# Patient Record
Sex: FEMALE | Race: ASIAN | NOT HISPANIC OR LATINO | URBAN - METROPOLITAN AREA
[De-identification: names, ages, dates, MRNs, and addresses within clinical notes are randomized per-mention and may not be internally consistent; named-entity substitution may affect disease eponyms.]

---

## 2023-07-06 ENCOUNTER — INPATIENT (INPATIENT)
Facility: HOSPITAL | Age: 68
LOS: 8 days | Discharge: HOME | DRG: 300 | End: 2023-07-15
Attending: STUDENT IN AN ORGANIZED HEALTH CARE EDUCATION/TRAINING PROGRAM | Admitting: STUDENT IN AN ORGANIZED HEALTH CARE EDUCATION/TRAINING PROGRAM
Payer: MEDICAID

## 2023-07-06 VITALS
SYSTOLIC BLOOD PRESSURE: 150 MMHG | HEIGHT: 58 IN | WEIGHT: 136.69 LBS | TEMPERATURE: 98 F | OXYGEN SATURATION: 98 % | RESPIRATION RATE: 18 BRPM | DIASTOLIC BLOOD PRESSURE: 83 MMHG | HEART RATE: 96 BPM

## 2023-07-06 PROCEDURE — 99285 EMERGENCY DEPT VISIT HI MDM: CPT

## 2023-07-06 PROCEDURE — 99053 MED SERV 10PM-8AM 24 HR FAC: CPT

## 2023-07-07 ENCOUNTER — TRANSCRIPTION ENCOUNTER (OUTPATIENT)
Age: 68
End: 2023-07-07

## 2023-07-07 DIAGNOSIS — I82.409 ACUTE EMBOLISM AND THROMBOSIS OF UNSPECIFIED DEEP VEINS OF UNSPECIFIED LOWER EXTREMITY: ICD-10-CM

## 2023-07-07 DIAGNOSIS — R09.89 OTHER SPECIFIED SYMPTOMS AND SIGNS INVOLVING THE CIRCULATORY AND RESPIRATORY SYSTEMS: ICD-10-CM

## 2023-07-07 DIAGNOSIS — C56.9 MALIGNANT NEOPLASM OF UNSPECIFIED OVARY: ICD-10-CM

## 2023-07-07 DIAGNOSIS — I82.402 ACUTE EMBOLISM AND THROMBOSIS OF UNSPECIFIED DEEP VEINS OF LEFT LOWER EXTREMITY: ICD-10-CM

## 2023-07-07 DIAGNOSIS — I80.10 PHLEBITIS AND THROMBOPHLEBITIS OF UNSPECIFIED FEMORAL VEIN: ICD-10-CM

## 2023-07-07 DIAGNOSIS — Z29.9 ENCOUNTER FOR PROPHYLACTIC MEASURES, UNSPECIFIED: ICD-10-CM

## 2023-07-07 LAB
ALBUMIN SERPL ELPH-MCNC: 4.1 G/DL — SIGNIFICANT CHANGE UP (ref 3.3–5)
ALP SERPL-CCNC: 81 U/L — SIGNIFICANT CHANGE UP (ref 40–120)
ALT FLD-CCNC: 14 U/L — SIGNIFICANT CHANGE UP (ref 10–45)
ANION GAP SERPL CALC-SCNC: 13 MMOL/L — SIGNIFICANT CHANGE UP (ref 5–17)
APTT BLD: 29.5 SEC — SIGNIFICANT CHANGE UP (ref 27.5–35.5)
APTT BLD: 31.1 SEC — SIGNIFICANT CHANGE UP (ref 27.5–35.5)
AST SERPL-CCNC: 17 U/L — SIGNIFICANT CHANGE UP (ref 10–40)
BASOPHILS # BLD AUTO: 0.02 K/UL — SIGNIFICANT CHANGE UP (ref 0–0.2)
BASOPHILS NFR BLD AUTO: 0.3 % — SIGNIFICANT CHANGE UP (ref 0–2)
BILIRUB SERPL-MCNC: 0.3 MG/DL — SIGNIFICANT CHANGE UP (ref 0.2–1.2)
BUN SERPL-MCNC: 11 MG/DL — SIGNIFICANT CHANGE UP (ref 7–23)
CALCIUM SERPL-MCNC: 9.7 MG/DL — SIGNIFICANT CHANGE UP (ref 8.4–10.5)
CHLORIDE SERPL-SCNC: 104 MMOL/L — SIGNIFICANT CHANGE UP (ref 96–108)
CO2 SERPL-SCNC: 24 MMOL/L — SIGNIFICANT CHANGE UP (ref 22–31)
CREAT SERPL-MCNC: 0.66 MG/DL — SIGNIFICANT CHANGE UP (ref 0.5–1.3)
EGFR: 95 ML/MIN/1.73M2 — SIGNIFICANT CHANGE UP
EOSINOPHIL # BLD AUTO: 0.21 K/UL — SIGNIFICANT CHANGE UP (ref 0–0.5)
EOSINOPHIL NFR BLD AUTO: 2.9 % — SIGNIFICANT CHANGE UP (ref 0–6)
GLUCOSE SERPL-MCNC: 125 MG/DL — HIGH (ref 70–99)
HCT VFR BLD CALC: 33.3 % — LOW (ref 34.5–45)
HCT VFR BLD CALC: 34.3 % — LOW (ref 34.5–45)
HGB BLD-MCNC: 11 G/DL — LOW (ref 11.5–15.5)
HGB BLD-MCNC: 11.1 G/DL — LOW (ref 11.5–15.5)
IMM GRANULOCYTES NFR BLD AUTO: 0.3 % — SIGNIFICANT CHANGE UP (ref 0–0.9)
INR BLD: 1.1 RATIO — SIGNIFICANT CHANGE UP (ref 0.88–1.16)
LYMPHOCYTES # BLD AUTO: 1.01 K/UL — SIGNIFICANT CHANGE UP (ref 1–3.3)
LYMPHOCYTES # BLD AUTO: 13.9 % — SIGNIFICANT CHANGE UP (ref 13–44)
MCHC RBC-ENTMCNC: 28.9 PG — SIGNIFICANT CHANGE UP (ref 27–34)
MCHC RBC-ENTMCNC: 28.9 PG — SIGNIFICANT CHANGE UP (ref 27–34)
MCHC RBC-ENTMCNC: 32.4 GM/DL — SIGNIFICANT CHANGE UP (ref 32–36)
MCHC RBC-ENTMCNC: 33 GM/DL — SIGNIFICANT CHANGE UP (ref 32–36)
MCV RBC AUTO: 87.4 FL — SIGNIFICANT CHANGE UP (ref 80–100)
MCV RBC AUTO: 89.3 FL — SIGNIFICANT CHANGE UP (ref 80–100)
MONOCYTES # BLD AUTO: 0.68 K/UL — SIGNIFICANT CHANGE UP (ref 0–0.9)
MONOCYTES NFR BLD AUTO: 9.4 % — SIGNIFICANT CHANGE UP (ref 2–14)
NEUTROPHILS # BLD AUTO: 5.33 K/UL — SIGNIFICANT CHANGE UP (ref 1.8–7.4)
NEUTROPHILS NFR BLD AUTO: 73.2 % — SIGNIFICANT CHANGE UP (ref 43–77)
NRBC # BLD: 0 /100 WBCS — SIGNIFICANT CHANGE UP (ref 0–0)
NRBC # BLD: 0 /100 WBCS — SIGNIFICANT CHANGE UP (ref 0–0)
PLATELET # BLD AUTO: 236 K/UL — SIGNIFICANT CHANGE UP (ref 150–400)
PLATELET # BLD AUTO: 255 K/UL — SIGNIFICANT CHANGE UP (ref 150–400)
POTASSIUM SERPL-MCNC: 3.9 MMOL/L — SIGNIFICANT CHANGE UP (ref 3.5–5.3)
POTASSIUM SERPL-SCNC: 3.9 MMOL/L — SIGNIFICANT CHANGE UP (ref 3.5–5.3)
PROT SERPL-MCNC: 8.1 G/DL — SIGNIFICANT CHANGE UP (ref 6–8.3)
PROTHROM AB SERPL-ACNC: 12.7 SEC — SIGNIFICANT CHANGE UP (ref 10.5–13.4)
RBC # BLD: 3.81 M/UL — SIGNIFICANT CHANGE UP (ref 3.8–5.2)
RBC # BLD: 3.84 M/UL — SIGNIFICANT CHANGE UP (ref 3.8–5.2)
RBC # FLD: 12.4 % — SIGNIFICANT CHANGE UP (ref 10.3–14.5)
RBC # FLD: 12.5 % — SIGNIFICANT CHANGE UP (ref 10.3–14.5)
SODIUM SERPL-SCNC: 141 MMOL/L — SIGNIFICANT CHANGE UP (ref 135–145)
WBC # BLD: 7.27 K/UL — SIGNIFICANT CHANGE UP (ref 3.8–10.5)
WBC # BLD: 7.57 K/UL — SIGNIFICANT CHANGE UP (ref 3.8–10.5)
WBC # FLD AUTO: 7.27 K/UL — SIGNIFICANT CHANGE UP (ref 3.8–10.5)
WBC # FLD AUTO: 7.57 K/UL — SIGNIFICANT CHANGE UP (ref 3.8–10.5)

## 2023-07-07 PROCEDURE — 99223 1ST HOSP IP/OBS HIGH 75: CPT

## 2023-07-07 PROCEDURE — 93971 EXTREMITY STUDY: CPT | Mod: 26,LT

## 2023-07-07 PROCEDURE — 71275 CT ANGIOGRAPHY CHEST: CPT | Mod: 26

## 2023-07-07 RX ORDER — HEPARIN SODIUM 5000 [USP'U]/ML
2500 INJECTION INTRAVENOUS; SUBCUTANEOUS EVERY 6 HOURS
Refills: 0 | Status: DISCONTINUED | OUTPATIENT
Start: 2023-07-07 | End: 2023-07-07

## 2023-07-07 RX ORDER — APIXABAN 2.5 MG/1
10 TABLET, FILM COATED ORAL EVERY 12 HOURS
Refills: 0 | Status: DISCONTINUED | OUTPATIENT
Start: 2023-07-07 | End: 2023-07-13

## 2023-07-07 RX ORDER — APIXABAN 2.5 MG/1
1 TABLET, FILM COATED ORAL
Qty: 180 | Refills: 0
Start: 2023-07-07 | End: 2023-10-04

## 2023-07-07 RX ORDER — HEPARIN SODIUM 5000 [USP'U]/ML
5000 INJECTION INTRAVENOUS; SUBCUTANEOUS EVERY 6 HOURS
Refills: 0 | Status: DISCONTINUED | OUTPATIENT
Start: 2023-07-07 | End: 2023-07-07

## 2023-07-07 RX ORDER — PANTOPRAZOLE SODIUM 20 MG/1
1 TABLET, DELAYED RELEASE ORAL
Refills: 0 | DISCHARGE

## 2023-07-07 RX ORDER — ERGOCALCIFEROL 1.25 MG/1
0 CAPSULE ORAL
Refills: 0 | DISCHARGE

## 2023-07-07 RX ORDER — ACETAMINOPHEN 500 MG
650 TABLET ORAL EVERY 6 HOURS
Refills: 0 | Status: DISCONTINUED | OUTPATIENT
Start: 2023-07-07 | End: 2023-07-15

## 2023-07-07 RX ORDER — DICLOFENAC SODIUM 75 MG/1
1 TABLET, DELAYED RELEASE ORAL
Refills: 0 | DISCHARGE

## 2023-07-07 RX ORDER — PANTOPRAZOLE SODIUM 20 MG/1
40 TABLET, DELAYED RELEASE ORAL
Refills: 0 | Status: DISCONTINUED | OUTPATIENT
Start: 2023-07-07 | End: 2023-07-15

## 2023-07-07 RX ORDER — HEPARIN SODIUM 5000 [USP'U]/ML
INJECTION INTRAVENOUS; SUBCUTANEOUS
Qty: 25000 | Refills: 0 | Status: DISCONTINUED | OUTPATIENT
Start: 2023-07-07 | End: 2023-07-07

## 2023-07-07 RX ORDER — HEPARIN SODIUM 5000 [USP'U]/ML
5000 INJECTION INTRAVENOUS; SUBCUTANEOUS ONCE
Refills: 0 | Status: COMPLETED | OUTPATIENT
Start: 2023-07-07 | End: 2023-07-07

## 2023-07-07 RX ORDER — IBANDRONATE SODIUM 150 MG/1
1 TABLET ORAL
Refills: 0 | DISCHARGE

## 2023-07-07 RX ADMIN — HEPARIN SODIUM 5000 UNIT(S): 5000 INJECTION INTRAVENOUS; SUBCUTANEOUS at 05:46

## 2023-07-07 RX ADMIN — HEPARIN SODIUM 1400 UNIT(S)/HR: 5000 INJECTION INTRAVENOUS; SUBCUTANEOUS at 13:03

## 2023-07-07 RX ADMIN — APIXABAN 10 MILLIGRAM(S): 2.5 TABLET, FILM COATED ORAL at 19:30

## 2023-07-07 RX ADMIN — Medication 650 MILLIGRAM(S): at 11:03

## 2023-07-07 RX ADMIN — HEPARIN SODIUM 1100 UNIT(S)/HR: 5000 INJECTION INTRAVENOUS; SUBCUTANEOUS at 07:28

## 2023-07-07 RX ADMIN — Medication 650 MILLIGRAM(S): at 15:13

## 2023-07-07 RX ADMIN — PANTOPRAZOLE SODIUM 40 MILLIGRAM(S): 20 TABLET, DELAYED RELEASE ORAL at 19:30

## 2023-07-07 RX ADMIN — HEPARIN SODIUM 1100 UNIT(S)/HR: 5000 INJECTION INTRAVENOUS; SUBCUTANEOUS at 05:46

## 2023-07-07 RX ADMIN — HEPARIN SODIUM 5000 UNIT(S): 5000 INJECTION INTRAVENOUS; SUBCUTANEOUS at 13:02

## 2023-07-07 NOTE — H&P ADULT - HISTORY OF PRESENT ILLNESS
68F with hx of ovarian cancer now in remission s/p radiation therapy 1/2021, s/p hysterectomy 8/2020 presents with LLE pain for the past 5 days and LLE swelling for 3 days. History obtained from pt in [place] and from daughter in law at bedside. Pt reports symptoms started off with thigh pain and then she progressively noticed swelling and pain in the groin. Reports she came back from [place] 3 weeks ago. Reports she has some difficulty ambulating due to heaviness in her left leg. Currently denies chest pain, abdominal pain, headaches, dizziness, fevers, chills, hematuria, hematochezia. Pt is visiting from [place], receives all her care in [place], does not have insurance.

## 2023-07-07 NOTE — DISCHARGE NOTE PROVIDER - NSDCFUADDAPPT_GEN_ALL_CORE_FT
APPTS ARE READY TO BE MADE: [X] YES    Best Family or Patient Contact (if needed):      1: Please follow up with PCP at primary care clinic at [address] [place] [address]   2:   3:     Other comments or requests:    APPTS ARE READY TO BE MADE: [X] YES    Best Family or Patient Contact (if needed):      1: Please follow up with PCP at primary care clinic at [address] [place] [address]   2:   3:     Other comments or requests:     Patient was previously scheduled to see Dr. Smith at 2:30PM on 8/4 at [address] [place] [address] APPTS ARE READY TO BE MADE: [X] YES    Best Family or Patient Contact (if needed):      1: Please follow up with PCP at primary care clinic at [address] [place] [address]   2:   3:     Other comments or requests:     Patient was previously scheduled to see Dr. Smith at 2:30PM on 8/4 at [address] [place] [id number]    Patient requested scheduling assistance. Will await a call from [facility] Nurse Navigator to establish care.

## 2023-07-07 NOTE — ED ADULT NURSE NOTE - NSFALLUNIVINTERV_ED_ALL_ED
Bed/Stretcher in lowest position, wheels locked, appropriate side rails in place/Call bell, personal items and telephone in reach/Instruct patient to call for assistance before getting out of bed/chair/stretcher/Non-slip footwear applied when patient is off stretcher/[place] to call system/Physically safe environment - no spills, clutter or unnecessary equipment/Purposeful proactive rounding/Room/bathroom lighting operational, light cord in reach

## 2023-07-07 NOTE — ED PROVIDER NOTE - PHYSICAL EXAMINATION
GENERAL: Awake, alert, NAD  HEENT: NC/AT, moist mucous membranes, PERRL, EOMI  LUNGS: CTAB, no wheezes or crackles   CARDIAC: RRR, no m/r/g  ABDOMEN: Soft, non tender, non distended, no rebound, no guarding  BACK: No midline spinal tenderness, no CVA tenderness  EXT: Swelling of the left extremity from the ankle to the thigh, patient has pulses but discoloration of the leg (bluish tinge) Patient has TTP in the left groin no calf tenderness.   NEURO: A&Ox3. Moving all extremities.  SKIN: Warm and dry. No rash.  PSYCH: Normal affect.

## 2023-07-07 NOTE — DISCHARGE NOTE PROVIDER - NSDCFUSCHEDAPPT_GEN_ALL_CORE_FT
[facility]  [facility]  [address]  Scheduled Appointment: 08/04/2023

## 2023-07-07 NOTE — ED PROVIDER NOTE - OBJECTIVE STATEMENT
68-year-old female medical history significant for ovarian cancer now in remission presenting with left leg swelling for the past 3 days.  Patient states symptoms started off with thigh pain and then she progressively noticed swelling and pain in the groin.  Patient has not taken anything for her symptoms.  Patient states she feels as if the leg is heavy.  Does not have pain with walking but has difficulty lifting up her legs secondary to heaviness.  Patient states she has never had the symptoms before.  Patient endorses that she recently came from [place] 3 weeks ago.  Patient denies nausea, vomiting, fevers, chills, chest pain, shortness of breath, headache or visual changes.

## 2023-07-07 NOTE — ED ADULT NURSE NOTE - OBJECTIVE STATEMENT
Pt is a 68y female c/o L groin, thigh, leg, foot pain, swelling, and heaviness. Pt reports flying in from [place] approx 3 weeks ago. Pt son reports pmhx of ovarian cancer treated with surgery and radiation, denies any other medical problems. Approx 3 days ago, pt reports pain and swelling down the L leg to the foot. Per pt she is having trouble ambulating because the leg feels too heavy to lift as she walks. Pt denies chest pain, SOB, dizziness. Pt is A&Ox3, VSS.

## 2023-07-07 NOTE — H&P ADULT - PROBLEM SELECTOR PLAN 3
Heparin gtt  SW follow up     D/w family at bedside Heparin gtt  SW follow up     D/w family at bedside  Advised pt and family to follow up with Clinic on discharge [phone]

## 2023-07-07 NOTE — CHART NOTE - NSCHARTNOTEFT_GEN_A_CORE
Pending results of CTA  Will start eliquis for the treatment of DVT tonight and monitor overnight then dc tomorrow   D/w family regarding above updates Pending results of CTA  Will start eliquis for the treatment of DVT tonight and monitor overnight then dc the pt tomorrow   D/w family regarding above updates Pending results of CTA  Will start eliquis for the treatment of DVT tonight and monitor overnight then dc the pt tomorrow if no events   D/w family regarding above updates

## 2023-07-07 NOTE — ED PROVIDER NOTE - CLINICAL SUMMARY MEDICAL DECISION MAKING FREE TEXT BOX
Differential is not limited to DVT, cellulitis, osteoarthritis.  Will obtain basic labs and get DVT studies.  Dispo pending labs imaging and reassessment.

## 2023-07-07 NOTE — ED PROVIDER NOTE - ATTENDING CONTRIBUTION TO CARE
MD Parsons:  patient seen and evaluated personally.   I agree with the History & Physical,  Impression & Plan other than what was detailed in my note.  MD Parsons  68-year-old female medical history significant for ovarian cancer now in remission presenting with leg swelling pain and heaviness on left leg, started three days ago, difficulty lifting leg from "heaviness" no n/t, no cp, sob, syncope, no abd pain, afebrile vitals stable, l leg is swollen w/ pos calf ttp, nv intact, no discoloration, brisk pulse b/l, start w/ labs, dvt study screen for clot burden.

## 2023-07-07 NOTE — DISCHARGE NOTE PROVIDER - NSDCMRMEDTOKEN_GEN_ALL_CORE_FT
Calcium tablet: 1 tablet orally once a day  Diacerein 50mg capsule: 1 tablet orally one a day  Diclofenac diethylamine, Linseed oil, Methyl salicylate, &amp; menthol gel: Apply topically as needed for pain  diclofenac potassium 50 mg oral tablet: 1 tab(s) orally as needed for pain  Eliquis 5 mg oral tablet: 1 tab(s) orally 2 times a day  ibandronate 150 mg oral tablet: 1 tab(s) orally once a month  pantoprazole 40 mg oral delayed release tablet: 1 tab(s) orally once a day  Vitamin B Complex, Zinc cap: 1 tablet orally once a day  Vitamin D: 1 tablet orally 2 times a month

## 2023-07-07 NOTE — H&P ADULT - ASSESSMENT
68F with hx of ovarian cancer now in remission s/p radiation therapy 1/2021, s/p hysterectomy 8/2020 presents with LLE pain for the past 5 days and LLE swelling for 3 days found to have LLE DVT

## 2023-07-07 NOTE — DISCHARGE NOTE PROVIDER - NSDCCPCAREPLAN_GEN_ALL_CORE_FT
PRINCIPAL DISCHARGE DIAGNOSIS  Diagnosis: Left leg DVT  Assessment and Plan of Treatment: Continue with Eliquis 5mg twice daily     PRINCIPAL DISCHARGE DIAGNOSIS  Diagnosis: Left leg DVT  Assessment and Plan of Treatment: You have a blood clot in your left leg and were started on eliquis. There was no blood clots in your lungs on CT imaging. A venogram showed the leg blood clot as well. Hematology saw you and suggested that this was likely provoked from a long flight and recommended pcp and hematology follow up in 1-2 weeks.

## 2023-07-07 NOTE — DISCHARGE NOTE PROVIDER - NSFOLLOWUPCLINICS_GEN_ALL_ED_FT
[facility]  Primary Care  [address] [place] [address]  Phone: [phone]  Fax:   Follow Up Time: 1 week     [facility]  Primary Care  [address] [place] [address]  Phone: [phone]  Fax:   Follow Up Time: 1 week    [facility]  Hematology/Oncology  [address] [place] [id number]  Phone: [phone]  Fax:

## 2023-07-07 NOTE — H&P ADULT - PROBLEM SELECTOR PLAN 1
Pt found to have LLE DVT in the setting of hx of ovarian cancer vs long flight from [place]   C/w heparin gtt  Check CTA r/o PE  Likely can be discharged on eliquis or lovenox  Heme eval   SW eval- pt does not have insurance; for medication assistance

## 2023-07-07 NOTE — ED PROVIDER NOTE - PROGRESS NOTE DETAILS
Rosie Mendoza, PGY-2 DO:  Informed from [employer] that patient has extensive DVT of entire left leg. Patient to be admitted. Will start patient on Heparin. Patient is agreeable to this plan.

## 2023-07-07 NOTE — CHART NOTE - NSCHARTNOTEFT_GEN_A_CORE
CTA negative for pulmonary embolism, heparin drip to be stopped at 5pm and pt to get eliquis 10mg at 6pm.   Will monitor pt overnight and likely dc tomorrow  D/w Dr. Juice Paredes PA-C  [id number]

## 2023-07-07 NOTE — DISCHARGE NOTE PROVIDER - HOSPITAL COURSE
68F with hx of ovarian cancer now in remission s/p radiation therapy 1/2021, s/p hysterectomy 8/2020 presents with LLE pain for the past 5 days and LLE swelling for 3 days. History obtained from pt in [place] and from daughter in law at bedside. Pt reports symptoms started off with thigh pain and then she progressively noticed swelling and pain in the groin. Reports she came back from [place] 3 weeks ago. Reports she has some difficulty ambulating due to heaviness in her left leg. Currently denies chest pain, abdominal pain, headaches, dizziness, fevers, chills, hematuria, hematochezia. Pt is visiting from [facility], receives all her care in [place], does not have insurance.  (07 Jul 2023 10:31)    Dx Left lower extremity DVT ( on Eliquis )      68F with hx of ovarian cancer now in remission s/p radiation therapy 1/2021, s/p hysterectomy 8/2020 presents with LLE pain for the past 5 days and LLE swelling for 3 days. History obtained from pt in [place] and from daughter in law at bedside. Pt reports symptoms started off with thigh pain and then she progressively noticed swelling and pain in the groin. Reports she came back from [place] 3 weeks ago. Reports she has some difficulty ambulating due to heaviness in her left leg. Currently denies chest pain, abdominal pain, headaches, dizziness, fevers, chills, hematuria, hematochezia. Pt is visiting from [facility], receives all her care in [place], does not have insurance.  (07 Jul 2023 10:31)    Dx Left lower extremity DVT ( on Eliquis )       Initiated/incomplete      68F with hx of ovarian cancer now in remission s/p radiation therapy 1/2021, s/p hysterectomy 8/2020 presents with LLE pain for the past 5 days and LLE swelling for 3 days. History obtained from pt in [place] and from daughter in law at bedside. Pt reports symptoms started off with thigh pain and then she progressively noticed swelling and pain in the groin. Reports she came back from [place] 3 weeks ago. Reports she has some difficulty ambulating due to heaviness in her left leg. Currently denies chest pain, abdominal pain, headaches, dizziness, fevers, chills, hematuria, hematochezia. Pt is visiting from [facility], receives all her care in [place], does not have insurance. Pt has LE duplex done which showed left leg DVT, CTA neg for PE. CTA also showed Mild-to-moderate narrowing of the proximal celiac artery with poststenotic dilatation and  compression of the celiac artery by the median arcuate ligament. Vascular consulted, recommend no intervention necessary for incidentally noted celiac compression as patient is asymptomatic. Vascular recommending CT Venogram to visualize the proximal extent of the DVT and Hematology consult for hypercoagulable workup. Hematology evaluated patient and DVT, possibly provoked given recent plane ride, but recommend CT A/P w/ IV contrast to assess for cancer recurrence.        68F with hx of ovarian cancer now in remission s/p radiation therapy 1/2021, s/p hysterectomy 8/2020 presents with LLE pain for the past 5 days and LLE swelling for 3 days found to have LLE DVT        Problem/Plan - 1:  ·  Problem: DVT, lower extremity.   ·  Plan: Pt found to have LLE DVT in the setting of hx of ovarian cancer vs long flight from [facility]   S/p heparin gtt  completed loading dose of eliquis 10mg bid; continue with eliquis 5mg BID    CTA negative for PE   CT Venogram obtained showing acute DVT extending from the left common iliac vein to the visualized left femoral vein  Heme eval appreciated; no further workup needed since DVT likely provoked from long flight; outpatient heme f/u at [facility] if insurance accepted   SW eval- pt does not have insurance; for medication assistance, pt to receive first 30 days free, then $600/month, family agreeable to pay  vascular recs noted; original plan for venogram 7/14 but equipment not available; considering transfer to [facility] but since patient improving and doing well no plans for intervention anymore; likely DC home in AM if stable.     Problem/Plan - 2:  ·  Problem: Ovarian cancer.   ·  Plan: S/p radiation therapy and hysterectomy in [place]  No acute issues.     Problem/Plan - 3:  ·  Problem: Celiac artery compression syndrome.   ·  Plan: Found to have incidentally noted compression of celiac origin which is asymptomatic.  No acute intervention.     Problem/Plan - 4:  ·  Problem: Prophylactic measure.   ·  Plan: Eliquis   SW follow up   Prior physician advised pt and family to follow up with Clinic on discharge [phone]    Patient seen and evaluated, no acute somatic complaints. Reviewed discharge medications with patient; All new medications requiring new prescriptions were sent to the pharmacy of patient's choice. Reviewed need for prescription for previous home medications and new prescriptions sent if requested. Medically cleared/stable for discharge as per Dr. Ferrari on 7/15/23 with close outpatient follow up within 1-2 weeks of discharge. Patient understands and agrees with plan of care.

## 2023-07-08 DIAGNOSIS — I70.8 ATHEROSCLEROSIS OF OTHER ARTERIES: ICD-10-CM

## 2023-07-08 DIAGNOSIS — I77.4 CELIAC ARTERY COMPRESSION SYNDROME: ICD-10-CM

## 2023-07-08 LAB
ANION GAP SERPL CALC-SCNC: 13 MMOL/L — SIGNIFICANT CHANGE UP (ref 5–17)
BUN SERPL-MCNC: 8 MG/DL — SIGNIFICANT CHANGE UP (ref 7–23)
CALCIUM SERPL-MCNC: 8.7 MG/DL — SIGNIFICANT CHANGE UP (ref 8.4–10.5)
CHLORIDE SERPL-SCNC: 102 MMOL/L — SIGNIFICANT CHANGE UP (ref 96–108)
CO2 SERPL-SCNC: 23 MMOL/L — SIGNIFICANT CHANGE UP (ref 22–31)
CREAT SERPL-MCNC: 0.68 MG/DL — SIGNIFICANT CHANGE UP (ref 0.5–1.3)
EGFR: 95 ML/MIN/1.73M2 — SIGNIFICANT CHANGE UP
GLUCOSE SERPL-MCNC: 110 MG/DL — HIGH (ref 70–99)
HCT VFR BLD CALC: 30.3 % — LOW (ref 34.5–45)
HCT VFR BLD CALC: 34.4 % — LOW (ref 34.5–45)
HCV AB S/CO SERPL IA: 0.07 S/CO — SIGNIFICANT CHANGE UP (ref 0–0.99)
HCV AB SERPL-IMP: SIGNIFICANT CHANGE UP
HGB BLD-MCNC: 11.1 G/DL — LOW (ref 11.5–15.5)
HGB BLD-MCNC: 9.9 G/DL — LOW (ref 11.5–15.5)
MAGNESIUM SERPL-MCNC: 1.9 MG/DL — SIGNIFICANT CHANGE UP (ref 1.6–2.6)
MCHC RBC-ENTMCNC: 28.6 PG — SIGNIFICANT CHANGE UP (ref 27–34)
MCHC RBC-ENTMCNC: 28.8 PG — SIGNIFICANT CHANGE UP (ref 27–34)
MCHC RBC-ENTMCNC: 32.3 GM/DL — SIGNIFICANT CHANGE UP (ref 32–36)
MCHC RBC-ENTMCNC: 32.7 GM/DL — SIGNIFICANT CHANGE UP (ref 32–36)
MCV RBC AUTO: 88.1 FL — SIGNIFICANT CHANGE UP (ref 80–100)
MCV RBC AUTO: 88.7 FL — SIGNIFICANT CHANGE UP (ref 80–100)
NRBC # BLD: 0 /100 WBCS — SIGNIFICANT CHANGE UP (ref 0–0)
NRBC # BLD: 0 /100 WBCS — SIGNIFICANT CHANGE UP (ref 0–0)
PHOSPHATE SERPL-MCNC: 2.5 MG/DL — SIGNIFICANT CHANGE UP (ref 2.5–4.5)
PLATELET # BLD AUTO: 234 K/UL — SIGNIFICANT CHANGE UP (ref 150–400)
PLATELET # BLD AUTO: 279 K/UL — SIGNIFICANT CHANGE UP (ref 150–400)
POTASSIUM SERPL-MCNC: 3.6 MMOL/L — SIGNIFICANT CHANGE UP (ref 3.5–5.3)
POTASSIUM SERPL-SCNC: 3.6 MMOL/L — SIGNIFICANT CHANGE UP (ref 3.5–5.3)
RBC # BLD: 3.44 M/UL — LOW (ref 3.8–5.2)
RBC # BLD: 3.88 M/UL — SIGNIFICANT CHANGE UP (ref 3.8–5.2)
RBC # FLD: 12.4 % — SIGNIFICANT CHANGE UP (ref 10.3–14.5)
RBC # FLD: 12.5 % — SIGNIFICANT CHANGE UP (ref 10.3–14.5)
SODIUM SERPL-SCNC: 138 MMOL/L — SIGNIFICANT CHANGE UP (ref 135–145)
WBC # BLD: 7.1 K/UL — SIGNIFICANT CHANGE UP (ref 3.8–10.5)
WBC # BLD: 8.47 K/UL — SIGNIFICANT CHANGE UP (ref 3.8–10.5)
WBC # FLD AUTO: 7.1 K/UL — SIGNIFICANT CHANGE UP (ref 3.8–10.5)
WBC # FLD AUTO: 8.47 K/UL — SIGNIFICANT CHANGE UP (ref 3.8–10.5)

## 2023-07-08 PROCEDURE — 99233 SBSQ HOSP IP/OBS HIGH 50: CPT

## 2023-07-08 PROCEDURE — 99253 IP/OBS CNSLTJ NEW/EST LOW 45: CPT

## 2023-07-08 PROCEDURE — 99223 1ST HOSP IP/OBS HIGH 75: CPT | Mod: GC

## 2023-07-08 RX ADMIN — PANTOPRAZOLE SODIUM 40 MILLIGRAM(S): 20 TABLET, DELAYED RELEASE ORAL at 05:09

## 2023-07-08 RX ADMIN — APIXABAN 10 MILLIGRAM(S): 2.5 TABLET, FILM COATED ORAL at 05:04

## 2023-07-08 RX ADMIN — Medication 650 MILLIGRAM(S): at 17:22

## 2023-07-08 RX ADMIN — Medication 650 MILLIGRAM(S): at 17:52

## 2023-07-08 RX ADMIN — Medication 650 MILLIGRAM(S): at 06:01

## 2023-07-08 RX ADMIN — Medication 650 MILLIGRAM(S): at 05:06

## 2023-07-08 RX ADMIN — APIXABAN 10 MILLIGRAM(S): 2.5 TABLET, FILM COATED ORAL at 17:19

## 2023-07-08 NOTE — CONSULT NOTE ADULT - ATTENDING COMMENTS
Patient with h/o uterine cancer s/p hysterectomy and adjuvant RT.  Patient admitted with acute extensive LLE DVT.    Vascular following; for CT Venogram.  Await input regarding any role of vascular intervention.    Continue anticoagulation.    Can get CT abdomen/Pelvis with contrast to evaluate for any evidence of active malignancy; if negative, can pursue hypercoaguable work-up.      Chris Bronson MD (Weekend Coverage)

## 2023-07-08 NOTE — PROGRESS NOTE ADULT - PROBLEM SELECTOR PLAN 1
Pt found to have LLE DVT in the setting of hx of ovarian cancer vs long flight from [place]   S/p heparin gtt  Now on eliquis 10mg bid   CTA negative for PE  Per vascular pending CT Venogram to visualize the proximal extent of the DVT  Heme eval   SW eval- pt does not have insurance; for medication assistance, pt to receive first 30 days free, then $600/month, family aggreable to pay

## 2023-07-08 NOTE — CONSULT NOTE ADULT - ATTENDING COMMENTS
68 year old woman with acute left common femoral vein thrombosis  incidental finding of celiac artery compression (asymptomatic)   recommend therapeutic anticoagulation  left leg elevation at rest  please obtain CT venogram of abdomen and pelvis  will follow

## 2023-07-08 NOTE — CONSULT NOTE ADULT - ASSESSMENT
68F with hx of ovarian cancer now in remission s/p radiation therapy 1/2021, s/p hysterectomy 8/2020 presents with DVT of left CFV extending below the knee, and incidentally noted compression of celiac origin which is asymptomatic.    - No intervention necessary for incidentally noted celiac compression as patient is asymptomatic.  - Please obtain CT Venogram to visualize the proximal extent of the DVT.  - Recommend elevation of LLE and compression with ACE wrap from toes to thigh.  - Recommend Hematology consult for hypercoagulable workup.  - Continue anticoagulation    Discussed with primary team, and Vascular Surgery Fellow Dr. Jones.    Vascular Surgery [id number]
no edema,  no murmurs,  regular rate and rhythm , no edema. 
68F with hx of endometrial cancer s/p hysterectomy 8/2020 and radiation therapy 1/2021 presents with DVT of left CFV extending below the knee, hematology consulted for hypercoagulable workup    #endometrial cancer, DVT  - records from daughter documented an endometrial adenocarcinoma w/ myometrial invasion (pT1bN0) as opposed to an ovarian cancer, recent CT A/P 5/16/23 negative for dz recurrence  - DVT, possibly provoked given recent plane ride, but would recommend CT A/P w/ IV contrast to assess for cancer recurrence  - on eliquis to continue for at least 3 months, vascular following pending CT venogram  - outpatient hematology oncology f/u for hypercoagulable w/u  - please include hematology oncology f/u at [facility] in discharge note

## 2023-07-08 NOTE — PROGRESS NOTE ADULT - PROBLEM SELECTOR PLAN 4
Eliquis   SW follow up     D/w family at bedside 7/8  Advised pt and family to follow up with Clinic on discharge [phone]

## 2023-07-09 ENCOUNTER — TRANSCRIPTION ENCOUNTER (OUTPATIENT)
Age: 68
End: 2023-07-09

## 2023-07-09 LAB
ANION GAP SERPL CALC-SCNC: 12 MMOL/L — SIGNIFICANT CHANGE UP (ref 5–17)
BUN SERPL-MCNC: 9 MG/DL — SIGNIFICANT CHANGE UP (ref 7–23)
CALCIUM SERPL-MCNC: 8.5 MG/DL — SIGNIFICANT CHANGE UP (ref 8.4–10.5)
CHLORIDE SERPL-SCNC: 102 MMOL/L — SIGNIFICANT CHANGE UP (ref 96–108)
CO2 SERPL-SCNC: 24 MMOL/L — SIGNIFICANT CHANGE UP (ref 22–31)
CREAT SERPL-MCNC: 0.68 MG/DL — SIGNIFICANT CHANGE UP (ref 0.5–1.3)
EGFR: 95 ML/MIN/1.73M2 — SIGNIFICANT CHANGE UP
GLUCOSE SERPL-MCNC: 106 MG/DL — HIGH (ref 70–99)
HCT VFR BLD CALC: 30.9 % — LOW (ref 34.5–45)
HGB BLD-MCNC: 10 G/DL — LOW (ref 11.5–15.5)
MAGNESIUM SERPL-MCNC: 2 MG/DL — SIGNIFICANT CHANGE UP (ref 1.6–2.6)
MCHC RBC-ENTMCNC: 28.6 PG — SIGNIFICANT CHANGE UP (ref 27–34)
MCHC RBC-ENTMCNC: 32.4 GM/DL — SIGNIFICANT CHANGE UP (ref 32–36)
MCV RBC AUTO: 88.3 FL — SIGNIFICANT CHANGE UP (ref 80–100)
NRBC # BLD: 0 /100 WBCS — SIGNIFICANT CHANGE UP (ref 0–0)
PHOSPHATE SERPL-MCNC: 2.3 MG/DL — LOW (ref 2.5–4.5)
PLATELET # BLD AUTO: 282 K/UL — SIGNIFICANT CHANGE UP (ref 150–400)
POTASSIUM SERPL-MCNC: 3.6 MMOL/L — SIGNIFICANT CHANGE UP (ref 3.5–5.3)
POTASSIUM SERPL-SCNC: 3.6 MMOL/L — SIGNIFICANT CHANGE UP (ref 3.5–5.3)
RBC # BLD: 3.5 M/UL — LOW (ref 3.8–5.2)
RBC # FLD: 12.4 % — SIGNIFICANT CHANGE UP (ref 10.3–14.5)
SODIUM SERPL-SCNC: 138 MMOL/L — SIGNIFICANT CHANGE UP (ref 135–145)
WBC # BLD: 7.27 K/UL — SIGNIFICANT CHANGE UP (ref 3.8–10.5)
WBC # FLD AUTO: 7.27 K/UL — SIGNIFICANT CHANGE UP (ref 3.8–10.5)

## 2023-07-09 PROCEDURE — 74177 CT ABD & PELVIS W/CONTRAST: CPT | Mod: 26

## 2023-07-09 PROCEDURE — 99233 SBSQ HOSP IP/OBS HIGH 50: CPT

## 2023-07-09 RX ORDER — SODIUM,POTASSIUM PHOSPHATES 278-250MG
1 POWDER IN PACKET (EA) ORAL ONCE
Refills: 0 | Status: COMPLETED | OUTPATIENT
Start: 2023-07-09 | End: 2023-07-09

## 2023-07-09 RX ADMIN — Medication 1 PACKET(S): at 09:57

## 2023-07-09 RX ADMIN — Medication 650 MILLIGRAM(S): at 06:21

## 2023-07-09 RX ADMIN — APIXABAN 10 MILLIGRAM(S): 2.5 TABLET, FILM COATED ORAL at 05:21

## 2023-07-09 RX ADMIN — Medication 650 MILLIGRAM(S): at 18:30

## 2023-07-09 RX ADMIN — Medication 650 MILLIGRAM(S): at 05:21

## 2023-07-09 RX ADMIN — APIXABAN 10 MILLIGRAM(S): 2.5 TABLET, FILM COATED ORAL at 18:13

## 2023-07-09 RX ADMIN — Medication 650 MILLIGRAM(S): at 18:12

## 2023-07-09 RX ADMIN — PANTOPRAZOLE SODIUM 40 MILLIGRAM(S): 20 TABLET, DELAYED RELEASE ORAL at 05:21

## 2023-07-09 NOTE — PROGRESS NOTE ADULT - PROBLEM SELECTOR PLAN 1
Pt found to have LLE DVT in the setting of hx of ovarian cancer vs long flight from [place]   S/p heparin gtt  Now on eliquis 10mg bid   CTA negative for PE  Per vascular pending CT Venogram to visualize the proximal extent of the DVT  Heme eval appreciated   SW eval- pt does not have insurance; for medication assistance, pt to receive first 30 days free, then $600/month, family aggreable to pay

## 2023-07-09 NOTE — DISCHARGE NOTE NURSING/CASE MANAGEMENT/SOCIAL WORK - NSDCPEFALRISK_GEN_ALL_CORE
For information on Fall & Injury Prevention, visit: [email or web address] OR  [email or web address] OR  https://www.cdc.gov/steadi/patient.html

## 2023-07-09 NOTE — DISCHARGE NOTE NURSING/CASE MANAGEMENT/SOCIAL WORK - PATIENT PORTAL LINK FT
You can access the FollowMyHealth Patient Portal offered by [facility] by registering at the following website: [email or web address]. By joining [employer]’s FollowMyHealth portal, you will also be able to view your health information using other applications (apps) compatible with our system.

## 2023-07-09 NOTE — PROGRESS NOTE ADULT - PROBLEM SELECTOR PLAN 4
Eliquis   SW follow up     D/w family at bedside 7/8  Advised pt and family to follow up with Clinic on discharge [phone]

## 2023-07-10 LAB
ANION GAP SERPL CALC-SCNC: 13 MMOL/L — SIGNIFICANT CHANGE UP (ref 5–17)
BUN SERPL-MCNC: 8 MG/DL — SIGNIFICANT CHANGE UP (ref 7–23)
CALCIUM SERPL-MCNC: 9 MG/DL — SIGNIFICANT CHANGE UP (ref 8.4–10.5)
CHLORIDE SERPL-SCNC: 104 MMOL/L — SIGNIFICANT CHANGE UP (ref 96–108)
CO2 SERPL-SCNC: 24 MMOL/L — SIGNIFICANT CHANGE UP (ref 22–31)
CREAT SERPL-MCNC: 0.65 MG/DL — SIGNIFICANT CHANGE UP (ref 0.5–1.3)
EGFR: 96 ML/MIN/1.73M2 — SIGNIFICANT CHANGE UP
GLUCOSE SERPL-MCNC: 87 MG/DL — SIGNIFICANT CHANGE UP (ref 70–99)
HCT VFR BLD CALC: 34.4 % — LOW (ref 34.5–45)
HGB BLD-MCNC: 11 G/DL — LOW (ref 11.5–15.5)
MAGNESIUM SERPL-MCNC: 2.2 MG/DL — SIGNIFICANT CHANGE UP (ref 1.6–2.6)
MCHC RBC-ENTMCNC: 28.4 PG — SIGNIFICANT CHANGE UP (ref 27–34)
MCHC RBC-ENTMCNC: 32 GM/DL — SIGNIFICANT CHANGE UP (ref 32–36)
MCV RBC AUTO: 88.9 FL — SIGNIFICANT CHANGE UP (ref 80–100)
NRBC # BLD: 0 /100 WBCS — SIGNIFICANT CHANGE UP (ref 0–0)
PHOSPHATE SERPL-MCNC: 2.4 MG/DL — LOW (ref 2.5–4.5)
PLATELET # BLD AUTO: 337 K/UL — SIGNIFICANT CHANGE UP (ref 150–400)
POTASSIUM SERPL-MCNC: 4 MMOL/L — SIGNIFICANT CHANGE UP (ref 3.5–5.3)
POTASSIUM SERPL-SCNC: 4 MMOL/L — SIGNIFICANT CHANGE UP (ref 3.5–5.3)
RBC # BLD: 3.87 M/UL — SIGNIFICANT CHANGE UP (ref 3.8–5.2)
RBC # FLD: 12.6 % — SIGNIFICANT CHANGE UP (ref 10.3–14.5)
SODIUM SERPL-SCNC: 141 MMOL/L — SIGNIFICANT CHANGE UP (ref 135–145)
WBC # BLD: 7.77 K/UL — SIGNIFICANT CHANGE UP (ref 3.8–10.5)
WBC # FLD AUTO: 7.77 K/UL — SIGNIFICANT CHANGE UP (ref 3.8–10.5)

## 2023-07-10 PROCEDURE — 99232 SBSQ HOSP IP/OBS MODERATE 35: CPT

## 2023-07-10 RX ORDER — POTASSIUM PHOSPHATE, MONOBASIC POTASSIUM PHOSPHATE, DIBASIC 236; 224 MG/ML; MG/ML
15 INJECTION, SOLUTION INTRAVENOUS ONCE
Refills: 0 | Status: COMPLETED | OUTPATIENT
Start: 2023-07-10 | End: 2023-07-10

## 2023-07-10 RX ADMIN — POTASSIUM PHOSPHATE, MONOBASIC POTASSIUM PHOSPHATE, DIBASIC 62.5 MILLIMOLE(S): 236; 224 INJECTION, SOLUTION INTRAVENOUS at 16:58

## 2023-07-10 RX ADMIN — Medication 650 MILLIGRAM(S): at 18:30

## 2023-07-10 RX ADMIN — PANTOPRAZOLE SODIUM 40 MILLIGRAM(S): 20 TABLET, DELAYED RELEASE ORAL at 05:38

## 2023-07-10 RX ADMIN — Medication 650 MILLIGRAM(S): at 17:34

## 2023-07-10 RX ADMIN — Medication 650 MILLIGRAM(S): at 06:37

## 2023-07-10 RX ADMIN — APIXABAN 10 MILLIGRAM(S): 2.5 TABLET, FILM COATED ORAL at 05:37

## 2023-07-10 RX ADMIN — APIXABAN 10 MILLIGRAM(S): 2.5 TABLET, FILM COATED ORAL at 16:59

## 2023-07-10 RX ADMIN — Medication 650 MILLIGRAM(S): at 05:37

## 2023-07-10 NOTE — PROGRESS NOTE ADULT - PROBLEM SELECTOR PLAN 1
Pt found to have LLE DVT in the setting of hx of ovarian cancer vs long flight from [place]   S/p heparin gtt  Now on eliquis 10mg bid   CTA negative for PE   vascular recs noted; CT Venogram obtained showing acute DVT extending from the left common iliac vein to the   visualized left femoral vein  Heme eval appreciated; no further workup needed since DVT likely provoked from long flight; outpatient heme f/u at [facility] if insurance accepted   SW eval- pt does not have insurance; for medication assistance, pt to receive first 30 days free, then $600/month, family agreeable to pay  DC planning to home Pt found to have LLE DVT in the setting of hx of ovarian cancer vs long flight from [place]   S/p heparin gtt  Now on eliquis 10mg bid   CTA negative for PE   vascular recs noted; CT Venogram obtained showing acute DVT extending from the left common iliac vein to the   visualized left femoral vein  Heme eval appreciated; no further workup needed since DVT likely provoked from long flight; outpatient heme f/u at [facility] if insurance accepted   SW eval- pt does not have insurance; for medication assistance, pt to receive first 30 days free, then $600/month, family agreeable to pay  vascular recs noted; plan for venogram later in week

## 2023-07-10 NOTE — PROGRESS NOTE ADULT - PROBLEM SELECTOR PLAN 4
Eliquis   SW follow up   Prior physician advised pt and family to follow up with Clinic on discharge [phone]    DC planning to home today  40 mins spent on DC planning Eliquis   SW follow up   Prior physician advised pt and family to follow up with Clinic on discharge [phone]    DISPO: pending vascular recs; possible venogram

## 2023-07-10 NOTE — CHART NOTE - NSCHARTNOTEFT_GEN_A_CORE
Patients chart reviewed. Patient with an oncologic history of GYN cancer s/p treatment in  [place], no evidence of metastatic disease. Clot likely provoked by prolonged immobilization on international flight - no hypercoagulable work-up indicated for a provoking cause. Can continue Eliquis for 3-6 months. Can follow-up with hematology at [facility] if insurance accepted.     Nik Calloway MD, PGY-5  Hematology/Medical Oncology Fellow  Pager: [phone]  Available on [employer] Teams  After 5pm or on weekends please contact  to page on-call fellow

## 2023-07-11 LAB
ANION GAP SERPL CALC-SCNC: 11 MMOL/L — SIGNIFICANT CHANGE UP (ref 5–17)
BUN SERPL-MCNC: 7 MG/DL — SIGNIFICANT CHANGE UP (ref 7–23)
CALCIUM SERPL-MCNC: 8.7 MG/DL — SIGNIFICANT CHANGE UP (ref 8.4–10.5)
CHLORIDE SERPL-SCNC: 104 MMOL/L — SIGNIFICANT CHANGE UP (ref 96–108)
CO2 SERPL-SCNC: 25 MMOL/L — SIGNIFICANT CHANGE UP (ref 22–31)
CREAT SERPL-MCNC: 0.66 MG/DL — SIGNIFICANT CHANGE UP (ref 0.5–1.3)
EGFR: 95 ML/MIN/1.73M2 — SIGNIFICANT CHANGE UP
GLUCOSE SERPL-MCNC: 90 MG/DL — SIGNIFICANT CHANGE UP (ref 70–99)
HCT VFR BLD CALC: 30.8 % — LOW (ref 34.5–45)
HGB BLD-MCNC: 10 G/DL — LOW (ref 11.5–15.5)
MAGNESIUM SERPL-MCNC: 2.2 MG/DL — SIGNIFICANT CHANGE UP (ref 1.6–2.6)
MCHC RBC-ENTMCNC: 28.9 PG — SIGNIFICANT CHANGE UP (ref 27–34)
MCHC RBC-ENTMCNC: 32.5 GM/DL — SIGNIFICANT CHANGE UP (ref 32–36)
MCV RBC AUTO: 89 FL — SIGNIFICANT CHANGE UP (ref 80–100)
NRBC # BLD: 0 /100 WBCS — SIGNIFICANT CHANGE UP (ref 0–0)
PHOSPHATE SERPL-MCNC: 3.1 MG/DL — SIGNIFICANT CHANGE UP (ref 2.5–4.5)
PLATELET # BLD AUTO: 325 K/UL — SIGNIFICANT CHANGE UP (ref 150–400)
POTASSIUM SERPL-MCNC: 4.1 MMOL/L — SIGNIFICANT CHANGE UP (ref 3.5–5.3)
POTASSIUM SERPL-SCNC: 4.1 MMOL/L — SIGNIFICANT CHANGE UP (ref 3.5–5.3)
RBC # BLD: 3.46 M/UL — LOW (ref 3.8–5.2)
RBC # FLD: 12.5 % — SIGNIFICANT CHANGE UP (ref 10.3–14.5)
SODIUM SERPL-SCNC: 140 MMOL/L — SIGNIFICANT CHANGE UP (ref 135–145)
WBC # BLD: 6.94 K/UL — SIGNIFICANT CHANGE UP (ref 3.8–10.5)
WBC # FLD AUTO: 6.94 K/UL — SIGNIFICANT CHANGE UP (ref 3.8–10.5)

## 2023-07-11 PROCEDURE — 99232 SBSQ HOSP IP/OBS MODERATE 35: CPT

## 2023-07-11 RX ADMIN — APIXABAN 10 MILLIGRAM(S): 2.5 TABLET, FILM COATED ORAL at 17:37

## 2023-07-11 RX ADMIN — Medication 650 MILLIGRAM(S): at 06:13

## 2023-07-11 RX ADMIN — PANTOPRAZOLE SODIUM 40 MILLIGRAM(S): 20 TABLET, DELAYED RELEASE ORAL at 05:08

## 2023-07-11 RX ADMIN — Medication 650 MILLIGRAM(S): at 18:20

## 2023-07-11 RX ADMIN — APIXABAN 10 MILLIGRAM(S): 2.5 TABLET, FILM COATED ORAL at 05:08

## 2023-07-11 RX ADMIN — Medication 650 MILLIGRAM(S): at 05:08

## 2023-07-11 RX ADMIN — Medication 650 MILLIGRAM(S): at 17:50

## 2023-07-11 NOTE — CONSULT NOTE ADULT - SUBJECTIVE AND OBJECTIVE BOX
Hematology Consult Note  ***recs not final until attending attestation***    Chris Becker MD PGY-4  Reachable on TEAMS    HPI:  68F with hx of endometrial cancer now in remission s/p radiation therapy 2021, s/p hysterectomy 2020 presents with LLE pain for the past 5 days and LLE swelling for 3 days. History obtained from pt in mt and from daughter in law at bedside. Pt reports symptoms started off with thigh pain and then she progressively noticed swelling and pain in the groin. Reports she came back from [place] 3 weeks ago. Reports she has some difficulty ambulating due to heaviness in her left leg. Currently denies chest pain, abdominal pain, headaches, dizziness, fevers, chills, hematuria, hematochezia. Pt is visiting from [facility], receives all her care in [facility], does not have insurance.    Pt found to have above and below knee DVT, placed on eliquis. In addition to the above, she denies weight changes, abdominal distention, urine or bowel issues. Flight from [facility]  for 18 hours, noticed pain in LLE end of  but swelling occurred 3 days ago. No history of clots or strokes. Had 1 miscarriage and 1 , no family history of clotting or blood disorders      Allergies    No Known Allergies    Intolerances        MEDICATIONS  (STANDING):  apixaban 10 milliGRAM(s) Oral every 12 hours  pantoprazole    Tablet 40 milliGRAM(s) Oral before breakfast    MEDICATIONS  (PRN):  acetaminophen     Tablet .. 650 milliGRAM(s) Oral every 6 hours PRN Mild Pain (1 - 3)      PAST MEDICAL & SURGICAL HISTORY:  endometrial cancer          FAMILY HISTORY:      SOCIAL HISTORY: No EtOH, no tobacco    REVIEW OF SYSTEMS:  All other systems were reviewed and are negative, except as noted    Height (cm): 147.3 ( @ 21:06)  Weight (kg): 65.136 ( @ 21:06)  BMI (kg/m2): 30 ( @ 21:06)  BSA (m2): 1.58 ( @ 21:06)    T(F): 97.5 (23 @ 11:03), Max: 98.7 (23 @ 20:48)  HR: 80 (23 @ 11:03)  BP: 98/63 (23 @ 11:03)  RR: 18 (23 @ 11:03)  SpO2: 97% (23 @ 11:03)  Wt(kg): --    Constitutional: NAD  Eyes: EOMI, sclera non-icteric  Neck: supple  Respiratory: no inc wob  Cardiovascular: RRR,   Gastrointestinal: soft, NTND, no masses palpable,  Extremities: LLE wrapped, no tender to palpation, RLE non-tender, non-edematous                          11.1   8.47  )-----------( 279      ( 2023 13:06 )             34.4           138  |  102  |  8   ----------------------------<  110<H>  3.6   |  23  |  0.68    Ca    8.7      2023 07:03  Phos  2.5       Mg     1.9         TPro  8.1  /  Alb  4.1  /  TBili  0.3  /  DBili  x   /  AST  17  /  ALT  14  /  AlkPhos  81  -      Magnesium: 1.9 mg/dL ( @ 07:03)  Phosphorus: 2.5 mg/dL ( @ 07:03)      RADIOLOGY & ADDITIONAL TESTS:  Studies reviewed.    
DATE OF SERVICE: 07-11-23 @ 23:54    Patient is a 68y old  Female who presents with a chief complaint of LLE pain (11 Jul 2023 16:51)      INTERVAL HISTORY:     TELEMETRY Personally reviewed:  	  MEDICATIONS:        PHYSICAL EXAM:  T(C): 36.7 (07-11-23 @ 20:47), Max: 37.2 (07-11-23 @ 04:15)  HR: 83 (07-11-23 @ 20:47) (83 - 89)  BP: 118/72 (07-11-23 @ 20:47) (106/71 - 119/68)  RR: 18 (07-11-23 @ 20:47) (18 - 18)  SpO2: 96% (07-11-23 @ 20:47) (96% - 97%)  Wt(kg): --  I&O's Summary    11 Jul 2023 07:01  -  11 Jul 2023 23:54  --------------------------------------------------------  IN: 240 mL / OUT: 500 mL / NET: -260 mL          Appearance: In no distress	  HEENT:    PERRL, EOMI	  Cardiovascular:  S1 S2, No JVD  Respiratory: Lungs clear to auscultation	  Gastrointestinal:  Soft, Non-tender, + BS	  Vascularature:  No edema of LE  Psychiatric: Appropriate affect   Neuro: no acute focal deficits                               10.0   6.94  )-----------( 325      ( 11 Jul 2023 07:08 )             30.8     07-11    140  |  104  |  7   ----------------------------<  90  4.1   |  25  |  0.66    Ca    8.7      11 Jul 2023 07:12  Phos  3.1     07-11  Mg     2.2     07-11          Labs personally reviewed      Assessment and Plan:   · Assessment	  68F with hx of ovarian cancer now in remission s/p radiation therapy 1/2021, s/p hysterectomy 8/2020 presents with LLE pain for the past 5 days and LLE swelling for 3 days found to have LLE DVT         Problem/Plan - 1:  ·  Problem:  Preop Risk stratification  ·  Plan:  CT Venogram obtained showing acute DVT extending from the left common iliac vein to the visualized left femoral vein  vascular recs noted; plan for venogram likely on Friday in OR;    - Denies cardiac hx  - reports fair functional capacity  - EKG and TTE ordered  - pending above mod risk for low risk venogram, no cardiac contraindication to proceed           Andrew Juarez DO [facility]  Cardiovascular Medicine  [address], [address]  Office: [phone]  Available via Text/call on Microsoft Teams
VASCULAR SURGERY CONSULT NOTE    Consulting attending: Dr. Mcfadden    HPI:  68F with hx of ovarian cancer now in remission s/p radiation therapy 1/2021, s/p hysterectomy 8/2020 presents with LLE pain for the past 5 days and LLE swelling for 3 days. History obtained from pt in mt and from daughter in law at bedside. Pt reports symptoms started off with thigh pain and then she progressively noticed swelling and pain in the groin. Reports she came back from [place] 3 weeks ago. Reports she has some difficulty ambulating due to heaviness in her left leg. Currently denies chest pain, abdominal pain, headaches, dizziness, fevers, chills, hematuria, hematochezia. Pt is visiting from [place], receives all her care in [place], does not have insurance.  (07 Jul 2023 10:31)    Patient admitted with LLE above knee and below knee DVT, for which she is currently on Eliquis. 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 Patient denies abdominal pain, nausea, vomiting, blood bowel movements, and is tolerating a regular diet. She denies LLE pain at this time and reports the swelling has improved.      PAST MEDICAL HISTORY:  Ovarian cancer        PAST SURGICAL HISTORY:      MEDICATIONS:  acetaminophen     Tablet .. 650 milliGRAM(s) Oral every 6 hours PRN  apixaban 10 milliGRAM(s) Oral every 12 hours  pantoprazole    Tablet 40 milliGRAM(s) Oral before breakfast      ALLERGIES:  No Known Allergies      VITALS & I/Os:  Vital Signs Last 24 Hrs  T(C): 37 (08 Jul 2023 04:15), Max: 37.1 (07 Jul 2023 20:48)  T(F): 98.6 (08 Jul 2023 04:15), Max: 98.7 (07 Jul 2023 20:48)  HR: 96 (08 Jul 2023 04:15) (86 - 96)  BP: 120/73 (08 Jul 2023 04:15) (103/64 - 125/76)  BP(mean): --  RR: 18 (08 Jul 2023 04:15) (18 - 18)  SpO2: 97% (08 Jul 2023 04:15) (97% - 99%)    Parameters below as of 08 Jul 2023 04:15  Patient On (Oxygen Delivery Method): room air        I&O's Summary      PHYSICAL EXAM:  General: well developed, well nourished, NAD  Neuro: alert and oriented, no focal deficits, moves all extremities spontaneously  HEENT: NCAT, EOMI, anicteric, mucosa moist  Respiratory: airway patent, respirations unlabored  CVS: regular rate and rhythm  Abdomen: soft, nontender, nondistended  Extremities: LLE edema to thigh, sensation and movement grossly intact,  DP/PT/femoral pulses palpable b/l  Skin: warm, dry, appropriate color      LABS:                        9.9    7.10  )-----------( 234      ( 08 Jul 2023 06:54 )             30.3     07-08    138  |  102  |  8   ----------------------------<  110<H>  3.6   |  23  |  0.68    Ca    8.7      08 Jul 2023 07:03  Phos  2.5     07-08  Mg     1.9     07-08    TPro  8.1  /  Alb  4.1  /  TBili  0.3  /  DBili  x   /  AST  17  /  ALT  14  /  AlkPhos  81  07-07    Lactate:    PT/INR - ( 07 Jul 2023 03:34 )   PT: 12.7 sec;   INR: 1.10 ratio         PTT - ( 07 Jul 2023 11:38 )  PTT:29.5 sec          Urinalysis Basic - ( 08 Jul 2023 07:03 )    Color: x / Appearance: x / SG: x / pH: x  Gluc: 110 mg/dL / Ketone: x  / Bili: x / Urobili: x   Blood: x / Protein: x / Nitrite: x   Leuk Esterase: x / RBC: x / WBC x   Sq Epi: x / Non Sq Epi: x / Bacteria: x    IMAGING:    < from: VA Duplex Lower Ext Vein Scan, Left (07.07.23 @ 05:57) >  FINDINGS:  Mildly echogenic intraluminal material seen throughout the left common   femoral vein, femoral vein, popliteal vein, posterior tibial vein, and   peroneal vein. These veins are not compressible and exhibit no Doppler   waveforms.    In the common femoral vein, there is slightly increased echogenicity,   which may reflect some degree of chronicity.    The contralateral common femoral vein is patent.    IMPRESSION:  Acute deep venous thrombosis: above and below the knee..    Intraluminal echogenicity compatible with DVT throughout the visualized   veins of the left leg from the common femoral vein to the posterior   tibial and peroneal veins, which are noncompressible without Doppler   waveforms.    Findings were discussed with provider Reji.    < end of copied text >  < from: CT Angio Chest PE Protocol w/ IV Cont (07.07.23 @ 15:29) >  PULMONARY ANGIOGRAM: No pulmonary embolism.    LYMPH NODES: No lymphadenopathy.    HEART/VASCULATURE: The heart is normal in size. No pericardial effusion.   There is mild to moderate narrowing of the proximal celiac artery with   poststenotic dilatation up to 1.0 cm.    AIRWAYS/LUNGS/PLEURA: The central airways are patent. Calcified granuloma   in the right lower lobe. Multiple bilateral lung nodules measuring up to   5 mm in the right upper lobe (2-29). No pleural effusion or pneumothorax.    UPPER ABDOMEN: Hepatic dome cyst.    BONES/SOFT TISSUES: Unremarkable.    IMPRESSION:    No pulmonary embolism.    Multiple bilateral pulmonary nodules measuring up to 5 mm as described   above. Follow-up CT is recommended in 6 months.    Mild-to-moderate narrowing of the proximal celiac artery with   poststenotic dilatation. This can be seen in compression of the celiac   artery by the median arcuate ligament. Correlate with symptoms.    < end of copied text >

## 2023-07-11 NOTE — CHART NOTE - NSCHARTNOTEFT_GEN_A_CORE
Patient and plan discussed with attending this AM. Plan for OR for venogram on Friday.     Please obtain medical and cardiac clearance for procedure.     Vascular Surgery   [id number]

## 2023-07-11 NOTE — PROGRESS NOTE ADULT - PROBLEM SELECTOR PLAN 1
Pt found to have LLE DVT in the setting of hx of ovarian cancer vs long flight from [place]   S/p heparin gtt  Now on eliquis 10mg bid   CTA negative for PE   CT Venogram obtained showing acute DVT extending from the left common iliac vein to the visualized left femoral vein  Heme eval appreciated; no further workup needed since DVT likely provoked from long flight; outpatient heme f/u at [facility] if insurance accepted   SW eval- pt does not have insurance; for medication assistance, pt to receive first 30 days free, then $600/month, family agreeable to pay  vascular recs noted; plan for venogram likely on Friday in OR; patient is medically optimized for planned procedure; no further testing indicated at this time

## 2023-07-11 NOTE — CONSULT NOTE ADULT - CONSULT REASON
Preop Risk Stratification
incidental finding on CTA of celiac compression, LLE DVT
hypercoagulable w/u

## 2023-07-11 NOTE — PROGRESS NOTE ADULT - PROBLEM SELECTOR PLAN 4
Eliquis   SW follow up   Prior physician advised pt and family to follow up with Clinic on discharge [phone]    DISPO: pending vascular recs; plan for OR venogram on Friday

## 2023-07-12 PROBLEM — Z00.00 ENCOUNTER FOR PREVENTIVE HEALTH EXAMINATION: Status: ACTIVE | Noted: 2023-07-12

## 2023-07-12 PROBLEM — C56.9 MALIGNANT NEOPLASM OF UNSPECIFIED OVARY: Chronic | Status: ACTIVE | Noted: 2023-07-07

## 2023-07-12 PROCEDURE — 93306 TTE W/DOPPLER COMPLETE: CPT | Mod: 26

## 2023-07-12 PROCEDURE — 99232 SBSQ HOSP IP/OBS MODERATE 35: CPT

## 2023-07-12 PROCEDURE — 93010 ELECTROCARDIOGRAM REPORT: CPT

## 2023-07-12 RX ADMIN — Medication 650 MILLIGRAM(S): at 07:30

## 2023-07-12 RX ADMIN — Medication 650 MILLIGRAM(S): at 06:13

## 2023-07-12 RX ADMIN — APIXABAN 10 MILLIGRAM(S): 2.5 TABLET, FILM COATED ORAL at 06:12

## 2023-07-12 RX ADMIN — APIXABAN 10 MILLIGRAM(S): 2.5 TABLET, FILM COATED ORAL at 17:06

## 2023-07-12 RX ADMIN — Medication 650 MILLIGRAM(S): at 17:37

## 2023-07-12 RX ADMIN — Medication 650 MILLIGRAM(S): at 17:07

## 2023-07-12 RX ADMIN — PANTOPRAZOLE SODIUM 40 MILLIGRAM(S): 20 TABLET, DELAYED RELEASE ORAL at 06:12

## 2023-07-12 NOTE — PROGRESS NOTE ADULT - PROBLEM SELECTOR PLAN 4
Eliquis   SW follow up   Prior physician advised pt and family to follow up with Clinic on discharge [phone]    DISPO: pending vascular recs; plan for OR venogram on Friday

## 2023-07-13 PROCEDURE — 99232 SBSQ HOSP IP/OBS MODERATE 35: CPT

## 2023-07-13 RX ORDER — APIXABAN 2.5 MG/1
5 TABLET, FILM COATED ORAL EVERY 12 HOURS
Refills: 0 | Status: DISCONTINUED | OUTPATIENT
Start: 2023-07-14 | End: 2023-07-15

## 2023-07-13 RX ORDER — APIXABAN 2.5 MG/1
10 TABLET, FILM COATED ORAL EVERY 12 HOURS
Refills: 0 | Status: COMPLETED | OUTPATIENT
Start: 2023-07-13 | End: 2023-07-14

## 2023-07-13 RX ADMIN — Medication 650 MILLIGRAM(S): at 20:00

## 2023-07-13 RX ADMIN — APIXABAN 10 MILLIGRAM(S): 2.5 TABLET, FILM COATED ORAL at 17:04

## 2023-07-13 RX ADMIN — APIXABAN 10 MILLIGRAM(S): 2.5 TABLET, FILM COATED ORAL at 07:03

## 2023-07-13 RX ADMIN — PANTOPRAZOLE SODIUM 40 MILLIGRAM(S): 20 TABLET, DELAYED RELEASE ORAL at 07:03

## 2023-07-13 RX ADMIN — Medication 650 MILLIGRAM(S): at 21:40

## 2023-07-13 NOTE — PROGRESS NOTE ADULT - PROBLEM SELECTOR PLAN 1
Pt found to have LLE DVT in the setting of hx of ovarian cancer vs long flight from [place]   S/p heparin gtt  Now on eliquis 10mg bid; will complete 14 doses tomorrow with 6am dose; will plan to continue with 5mg BID tomorrow 6pm   CTA negative for PE   CT Venogram obtained showing acute DVT extending from the left common iliac vein to the visualized left femoral vein  Heme eval appreciated; no further workup needed since DVT likely provoked from long flight; outpatient heme f/u at [facility] if insurance accepted   SW eval- pt does not have insurance; for medication assistance, pt to receive first 30 days free, then $600/month, family agreeable to pay  vascular recs noted; plan for venogram likely on Friday in OR but equipment unavailable?; f/u vascular for new timing; will keep NPO after midnight for now in case can be done tomorrow; patient is medically optimized for planned procedure; no further testing indicated at this time  cardio recs appreciated; cleared for OR; EKG and ECHO unremarkable

## 2023-07-13 NOTE — PROGRESS NOTE ADULT - PROBLEM SELECTOR PLAN 4
Eliquis   SW follow up   Prior physician advised pt and family to follow up with Clinic on discharge [phone]    DISPO: pending vascular recs; plan for OR venogram

## 2023-07-14 ENCOUNTER — APPOINTMENT (OUTPATIENT)
Dept: VASCULAR SURGERY | Facility: HOSPITAL | Age: 68
End: 2023-07-14

## 2023-07-14 LAB
ANION GAP SERPL CALC-SCNC: 14 MMOL/L — SIGNIFICANT CHANGE UP (ref 5–17)
APTT BLD: 35.7 SEC — HIGH (ref 27.5–35.5)
BLD GP AB SCN SERPL QL: NEGATIVE — SIGNIFICANT CHANGE UP
BUN SERPL-MCNC: 10 MG/DL — SIGNIFICANT CHANGE UP (ref 7–23)
CALCIUM SERPL-MCNC: 9.9 MG/DL — SIGNIFICANT CHANGE UP (ref 8.4–10.5)
CHLORIDE SERPL-SCNC: 102 MMOL/L — SIGNIFICANT CHANGE UP (ref 96–108)
CO2 SERPL-SCNC: 25 MMOL/L — SIGNIFICANT CHANGE UP (ref 22–31)
CREAT SERPL-MCNC: 0.69 MG/DL — SIGNIFICANT CHANGE UP (ref 0.5–1.3)
EGFR: 94 ML/MIN/1.73M2 — SIGNIFICANT CHANGE UP
GLUCOSE SERPL-MCNC: 96 MG/DL — SIGNIFICANT CHANGE UP (ref 70–99)
HCT VFR BLD CALC: 37 % — SIGNIFICANT CHANGE UP (ref 34.5–45)
HGB BLD-MCNC: 11.5 G/DL — SIGNIFICANT CHANGE UP (ref 11.5–15.5)
INR BLD: 1.32 RATIO — HIGH (ref 0.88–1.16)
MAGNESIUM SERPL-MCNC: 2.4 MG/DL — SIGNIFICANT CHANGE UP (ref 1.6–2.6)
MCHC RBC-ENTMCNC: 28 PG — SIGNIFICANT CHANGE UP (ref 27–34)
MCHC RBC-ENTMCNC: 31.1 GM/DL — LOW (ref 32–36)
MCV RBC AUTO: 90.2 FL — SIGNIFICANT CHANGE UP (ref 80–100)
NRBC # BLD: 0 /100 WBCS — SIGNIFICANT CHANGE UP (ref 0–0)
PHOSPHATE SERPL-MCNC: 3.4 MG/DL — SIGNIFICANT CHANGE UP (ref 2.5–4.5)
PLATELET # BLD AUTO: 432 K/UL — HIGH (ref 150–400)
POTASSIUM SERPL-MCNC: 4 MMOL/L — SIGNIFICANT CHANGE UP (ref 3.5–5.3)
POTASSIUM SERPL-SCNC: 4 MMOL/L — SIGNIFICANT CHANGE UP (ref 3.5–5.3)
PROTHROM AB SERPL-ACNC: 15.4 SEC — HIGH (ref 10.5–13.4)
RBC # BLD: 4.1 M/UL — SIGNIFICANT CHANGE UP (ref 3.8–5.2)
RBC # FLD: 12.5 % — SIGNIFICANT CHANGE UP (ref 10.3–14.5)
RH IG SCN BLD-IMP: POSITIVE — SIGNIFICANT CHANGE UP
SODIUM SERPL-SCNC: 141 MMOL/L — SIGNIFICANT CHANGE UP (ref 135–145)
WBC # BLD: 7.33 K/UL — SIGNIFICANT CHANGE UP (ref 3.8–10.5)
WBC # FLD AUTO: 7.33 K/UL — SIGNIFICANT CHANGE UP (ref 3.8–10.5)

## 2023-07-14 PROCEDURE — 99232 SBSQ HOSP IP/OBS MODERATE 35: CPT

## 2023-07-14 RX ADMIN — APIXABAN 10 MILLIGRAM(S): 2.5 TABLET, FILM COATED ORAL at 06:06

## 2023-07-14 RX ADMIN — APIXABAN 5 MILLIGRAM(S): 2.5 TABLET, FILM COATED ORAL at 17:20

## 2023-07-14 RX ADMIN — PANTOPRAZOLE SODIUM 40 MILLIGRAM(S): 20 TABLET, DELAYED RELEASE ORAL at 06:06

## 2023-07-14 NOTE — PROGRESS NOTE ADULT - PROBLEM SELECTOR PLAN 4
Eliquis   SW follow up   Prior physician advised pt and family to follow up with Clinic on discharge [phone]    DISPO: likely DC in AM if remains stable

## 2023-07-14 NOTE — PROGRESS NOTE ADULT - PROBLEM SELECTOR PLAN 1
Pt found to have LLE DVT in the setting of hx of ovarian cancer vs long flight from [place]   S/p heparin gtt  completed loading dose of eliquis 10mg bid; continue with eliquis 5mg BID    CTA negative for PE   CT Venogram obtained showing acute DVT extending from the left common iliac vein to the visualized left femoral vein  Heme eval appreciated; no further workup needed since DVT likely provoked from long flight; outpatient heme f/u at [facility] if insurance accepted   SW eval- pt does not have insurance; for medication assistance, pt to receive first 30 days free, then $600/month, family agreeable to pay  vascular recs noted; original plan for venogram 7/14 but equipment not available; considering transfer to [facility] but since patient improving and doing well no plans for intervention anymore; likely DC home in AM if stable

## 2023-07-14 NOTE — PROGRESS NOTE ADULT - ATTENDING COMMENTS
LLE pain markedly improved  patients states she feels well  ambulating without difficulty  no longer has need for intervention so can avoid transfer to [facility]  recommend discharge home when cleared by primary team  leg elevation at rest  compression stockings  follow up as outpatient

## 2023-07-15 VITALS
HEART RATE: 80 BPM | RESPIRATION RATE: 18 BRPM | TEMPERATURE: 98 F | OXYGEN SATURATION: 96 % | SYSTOLIC BLOOD PRESSURE: 115 MMHG | DIASTOLIC BLOOD PRESSURE: 75 MMHG

## 2023-07-15 PROCEDURE — 74177 CT ABD & PELVIS W/CONTRAST: CPT

## 2023-07-15 PROCEDURE — 99239 HOSP IP/OBS DSCHRG MGMT >30: CPT

## 2023-07-15 PROCEDURE — 86803 HEPATITIS C AB TEST: CPT

## 2023-07-15 PROCEDURE — 86901 BLOOD TYPING SEROLOGIC RH(D): CPT

## 2023-07-15 PROCEDURE — 80053 COMPREHEN METABOLIC PANEL: CPT

## 2023-07-15 PROCEDURE — 80048 BASIC METABOLIC PNL TOTAL CA: CPT

## 2023-07-15 PROCEDURE — C8929: CPT

## 2023-07-15 PROCEDURE — 83735 ASSAY OF MAGNESIUM: CPT

## 2023-07-15 PROCEDURE — 85730 THROMBOPLASTIN TIME PARTIAL: CPT

## 2023-07-15 PROCEDURE — 86850 RBC ANTIBODY SCREEN: CPT

## 2023-07-15 PROCEDURE — 86900 BLOOD TYPING SEROLOGIC ABO: CPT

## 2023-07-15 PROCEDURE — 71275 CT ANGIOGRAPHY CHEST: CPT

## 2023-07-15 PROCEDURE — 36415 COLL VENOUS BLD VENIPUNCTURE: CPT

## 2023-07-15 PROCEDURE — 93971 EXTREMITY STUDY: CPT

## 2023-07-15 PROCEDURE — 85610 PROTHROMBIN TIME: CPT

## 2023-07-15 PROCEDURE — 99285 EMERGENCY DEPT VISIT HI MDM: CPT

## 2023-07-15 PROCEDURE — 85027 COMPLETE CBC AUTOMATED: CPT

## 2023-07-15 PROCEDURE — 84100 ASSAY OF PHOSPHORUS: CPT

## 2023-07-15 PROCEDURE — 93005 ELECTROCARDIOGRAM TRACING: CPT

## 2023-07-15 RX ADMIN — APIXABAN 5 MILLIGRAM(S): 2.5 TABLET, FILM COATED ORAL at 05:34

## 2023-07-15 RX ADMIN — PANTOPRAZOLE SODIUM 40 MILLIGRAM(S): 20 TABLET, DELAYED RELEASE ORAL at 05:34

## 2023-07-15 NOTE — PROGRESS NOTE ADULT - NSPROGADDITIONALINFOA_GEN_ALL_CORE
Discussed with Yumiko DOLAN
Discussed with ACP, Sylvia    Discussed with daughter in law over the phone
Discussed with ACP, Merced Diaz    Discussed with daughter in law at bedside.
Discussed with ACP, Sylvia    Discussed with vascular resident.
Discussed with Yumiko DOLAN
D/w KELSI Bergman
D/w KELSI Bergman
Discussed with ACP, Samuel    Discussed with daughter in law over the phone

## 2023-07-15 NOTE — PROGRESS NOTE ADULT - PROBLEM SELECTOR PROBLEM 1
DVT, lower extremity

## 2023-07-15 NOTE — PROGRESS NOTE ADULT - PROBLEM SELECTOR PLAN 3
Found to have incidentally noted compression of celiac origin which is asymptomatic.  No acute intervention

## 2023-07-15 NOTE — PROGRESS NOTE ADULT - PROBLEM SELECTOR PROBLEM 2
Ovarian cancer

## 2023-07-15 NOTE — PROGRESS NOTE ADULT - PROVIDER SPECIALTY LIST ADULT
Hospitalist
Vascular Surgery
(4) walks frequently
Cardiology
Hospitalist
Vascular Surgery
Hospitalist
Hospitalist

## 2023-07-15 NOTE — PROGRESS NOTE ADULT - REASON FOR ADMISSION
LLE pain

## 2023-07-15 NOTE — PROGRESS NOTE ADULT - PROBLEM SELECTOR PLAN 2
S/p radiation therapy and hysterectomy in [place]  No acute issues

## 2023-07-15 NOTE — PROGRESS NOTE ADULT - PROBLEM SELECTOR PROBLEM 3
Celiac artery compression syndrome

## 2023-07-15 NOTE — PROGRESS NOTE ADULT - PROBLEM SELECTOR PLAN 4
Eliquis   SW follow up   Prior physician advised pt and family to follow up with Clinic on discharge [phone]    DISPO: DC planning to home today   40 mins spent on DC planning

## 2023-07-15 NOTE — PROGRESS NOTE ADULT - PROBLEM SELECTOR PLAN 1
Pt found to have LLE DVT in the setting of hx of ovarian cancer vs long flight from [place]   S/p heparin gtt  completed loading dose of eliquis 10mg bid; continue with eliquis 5mg BID    CTA negative for PE   CT Venogram obtained showing acute DVT extending from the left common iliac vein to the visualized left femoral vein  Heme eval appreciated; no further workup needed since DVT likely provoked from long flight; outpatient heme f/u at [facility] if insurance accepted   SW eval- pt does not have insurance; for medication assistance, pt to receive first 30 days free, then $600/month, family agreeable to pay  vascular recs noted; original plan for venogram 7/14 but equipment not available; considering transfer to [facility] but since patient improving and doing well no plans for intervention anymore  DC planning to home today

## 2023-07-15 NOTE — PROGRESS NOTE ADULT - ASSESSMENT
68F with hx of ovarian cancer now in remission s/p radiation therapy 1/2021, s/p hysterectomy 8/2020 presents with LLE pain for the past 5 days and LLE swelling for 3 days found to have LLE DVT 
A: 68F with hx of ovarian cancer now in remission s/p radiation therapy 1/2021, s/p hysterectomy 8/2020 presents with DVT of left CFV extending below the knee, and incidentally noted compression of celiac origin which is asymptomatic.    - No acute intervention necessary for incidentally noted celiac compression as patient is asymptomatic.  - Plan for OR for venogram on Friday  - Please obtain medical and cardiac clearance for procedure.   - Recommend elevation of LLE and compression with ACE wrap from toes to thigh.  - Continue anticoagulation per Heme-Onc    Vascular Surgery [id number]  
A: 68F with hx of ovarian cancer now in remission s/p radiation therapy 1/2021, s/p hysterectomy 8/2020 presents with DVT of left CFV extending below the knee, and incidentally noted compression of celiac origin which is asymptomatic.    - No acute intervention necessary for incidentally noted celiac compression as patient is asymptomatic.  - Equipment for venogram unavailable for OR on friday  - Appreciate medicine transferring to [facility] for expedition of care, okay to admit under Dr. Mcfadden at [facility]  - Appreciate cards/meds clearance   - Recommend elevation of LLE and compression with ACE wrap from toes to thigh.  - Continue anticoagulation per Heme-Onc    Vascular Surgery [id number]  
A: 68F with hx of ovarian cancer now in remission s/p radiation therapy 1/2021, s/p hysterectomy 8/2020 presents with DVT of left CFV extending below the knee, and incidentally noted compression of celiac origin which is asymptomatic.    - No acute intervention necessary for incidentally noted celiac compression as patient is asymptomatic.  - Reviewing CT venogram result - pt may require venogram later in week  - Recommend elevation of LLE and compression with ACE wrap from toes to thigh.  - Continue anticoagulation per Heme-Onc    Vascular Surgery [id number]  
A: 68F with hx of ovarian cancer now in remission s/p radiation therapy 1/2021, s/p hysterectomy 8/2020 presents with DVT of left CFV extending below the knee, and incidentally noted compression of celiac origin which is asymptomatic.    - No acute intervention necessary for incidentally noted celiac compression as patient is asymptomatic.  - Equipment for venogram unavailable for OR on friday, attempting to determine when OR time can be rescheduled and if pt able to be discharged/needs insurance auth  - Appreciate cards/meds clearance   - Recommend elevation of LLE and compression with ACE wrap from toes to thigh.  - Continue anticoagulation per Heme-Onc    Vascular Surgery [id number]  
68F with hx of ovarian cancer now in remission s/p radiation therapy 1/2021, s/p hysterectomy 8/2020 presents with LLE pain for the past 5 days and LLE swelling for 3 days found to have LLE DVT 

## 2023-08-02 ENCOUNTER — OUTPATIENT (OUTPATIENT)
Dept: OUTPATIENT SERVICES | Facility: HOSPITAL | Age: 68
LOS: 1 days | End: 2023-08-02
Payer: SELF-PAY

## 2023-08-02 ENCOUNTER — APPOINTMENT (OUTPATIENT)
Age: 68
End: 2023-08-02
Payer: COMMERCIAL

## 2023-08-02 VITALS
DIASTOLIC BLOOD PRESSURE: 76 MMHG | HEART RATE: 78 BPM | BODY MASS INDEX: 29.6 KG/M2 | OXYGEN SATURATION: 99 % | HEIGHT: 58 IN | WEIGHT: 141 LBS | SYSTOLIC BLOOD PRESSURE: 130 MMHG

## 2023-08-02 DIAGNOSIS — Z85.43 PERSONAL HISTORY OF MALIGNANT NEOPLASM OF OVARY: ICD-10-CM

## 2023-08-02 DIAGNOSIS — I82.409 ACUTE EMBOLISM AND THROMBOSIS OF UNSPECIFIED DEEP VEINS OF UNSPECIFIED LOWER EXTREMITY: ICD-10-CM

## 2023-08-02 DIAGNOSIS — I10 ESSENTIAL (PRIMARY) HYPERTENSION: ICD-10-CM

## 2023-08-02 PROCEDURE — ZZZZZ: CPT

## 2023-08-02 PROCEDURE — G0463: CPT

## 2023-08-02 RX ORDER — APIXABAN 5 MG/1
5 TABLET, FILM COATED ORAL TWICE DAILY
Qty: 60 | Refills: 2 | Status: ACTIVE | OUTPATIENT
Start: 2023-08-02

## 2023-08-02 RX ORDER — APIXABAN 5 MG/1
5 TABLET, FILM COATED ORAL TWICE DAILY
Qty: 60 | Refills: 0 | Status: ACTIVE | OUTPATIENT
Start: 2023-08-02

## 2023-08-02 NOTE — END OF VISIT
[] : Resident [FreeTextEntry3] : In addition to above, Eliquis Financial aid paperwork completed in office today, will try to expedite but warned the family that even with expediting the request, we may not be able to obtain medications in time since she only has 2 weeks left. Provided family member with paper script w/ 30 day supply no refills to use if unable to obtain financial aid.

## 2023-08-02 NOTE — ASSESSMENT
[FreeTextEntry1] : Ms. Leyva is a 69 yo F with pmh of ovarian cancer s/p hysterectomy (2020) and radiation (2021) who is following up after a recent hospitalization at [facility] where she was diagnosed with provoked LLE DVT and started on Eliquis. The patient is doing well and taking the medicine as prescribed. She needs 3-6 months of Eliquis therapy to treat the DVT completely. She does not have a social security number, cannot work, and cannot get health insurance. Planning to fill out paperwork for patient and expedite approval as she will run out of Eliquis in two weeks.   #Provoked LLE DVT - Continue Eliquis 5mg BID - Will investigate ways to assist patient in obtaining medication given she is uninsured and currently has no income or social security number

## 2023-08-02 NOTE — HISTORY OF PRESENT ILLNESS
[Family Member] : family member [FreeTextEntry8] : CC: Hospital follow up  Ms. Leyva is a 69 yo F with pmh of ovarian cancer s/p hysterectomy (2020) and radiation (2021) who is following up after a recent hospitalization at [facility]. The patient had flown back from [facility] about three weeks ago. Over the next couple weeks, she developed swelling and pain in her left leg. She was diagnosed with provoked LLE DVT extending to left femoral vein. She was started on Eliquis load. Vascular saw her and decided no acute intervention was necessary. She improved daily and was deemed medically stable for discharge home on Eliquis 5 BID. She presents to us to refill her eliquis.   Since discharge, the patient is improving. No complaints and taking her medication as prescribed. Pain has been tolerable and swelling is improving. Denies fever ,chills, SOB, CP, AP, changes in urine or stools.

## 2023-08-02 NOTE — PHYSICAL EXAM
[No Acute Distress] : no acute distress [Well Nourished] : well nourished [Well Developed] : well developed [Normal Sclera/Conjunctiva] : normal sclera/conjunctiva [PERRL] : pupils equal round and reactive to light [EOMI] : extraocular movements intact [Normal Outer Ear/Nose] : the outer ears and nose were normal in appearance [Normal Oropharynx] : the oropharynx was normal [No JVD] : no jugular venous distention [No Lymphadenopathy] : no lymphadenopathy [No Respiratory Distress] : no respiratory distress  [Clear to Auscultation] : lungs were clear to auscultation bilaterally [Normal Rate] : normal rate  [Normal S1, S2] : normal S1 and S2 [Soft] : abdomen soft [Non Tender] : non-tender [Non-distended] : non-distended [Normal Bowel Sounds] : normal bowel sounds [No CVA Tenderness] : no CVA  tenderness [No Spinal Tenderness] : no spinal tenderness [No Rash] : no rash [Coordination Grossly Intact] : coordination grossly intact [No Focal Deficits] : no focal deficits [Normal Affect] : the affect was normal [de-identified] : Left leg markedly swollen, lower left leg wrapped in ace bandage. 2+ edema present below knee, non tender.

## 2023-08-03 DIAGNOSIS — I82.409 ACUTE EMBOLISM AND THROMBOSIS OF UNSPECIFIED DEEP VEINS OF UNSPECIFIED LOWER EXTREMITY: ICD-10-CM

## 2023-08-03 DIAGNOSIS — Z85.43 PERSONAL HISTORY OF MALIGNANT NEOPLASM OF OVARY: ICD-10-CM

## 2023-08-17 ENCOUNTER — APPOINTMENT (OUTPATIENT)
Dept: HEMATOLOGY ONCOLOGY | Facility: CLINIC | Age: 68
End: 2023-08-17

## 2024-01-01 NOTE — PROGRESS NOTE ADULT - SUBJECTIVE AND OBJECTIVE BOX
Patient is a 68y old  Female who presents with a chief complaint of LLE pain (08 Jul 2023 18:29)      SUBJECTIVE / OVERNIGHT EVENTS: translating in mt by self, pt feels better, left groin pain better, no cp, sob     MEDICATIONS  (STANDING):  apixaban 10 milliGRAM(s) Oral every 12 hours  pantoprazole    Tablet 40 milliGRAM(s) Oral before breakfast    MEDICATIONS  (PRN):  acetaminophen     Tablet .. 650 milliGRAM(s) Oral every 6 hours PRN Mild Pain (1 - 3)        CAPILLARY BLOOD GLUCOSE        I&O's Summary      PHYSICAL EXAM:  GENERAL: NAD, well-developed  HEAD:  Atraumatic, Normocephalic  EYES: conjunctiva and sclera clear  NECK: Supple, No JVD  CHEST/LUNG: Clear to auscultation bilaterally; No wheeze  HEART: Regular rate and rhythm; No murmurs, rubs, or gallops  ABDOMEN: Soft, Nontender, Nondistended; Bowel sounds present  EXTREMITIES:  +RLE2 edema   PSYCH: AAOx3    LABS:                        10.0   7.27  )-----------( 282      ( 09 Jul 2023 07:08 )             30.9     07-09    138  |  102  |  9   ----------------------------<  106<H>  3.6   |  24  |  0.68    Ca    8.5      09 Jul 2023 07:07  Phos  2.3     07-09  Mg     2.0     07-09            Urinalysis Basic - ( 09 Jul 2023 07:07 )    Color: x / Appearance: x / SG: x / pH: x  Gluc: 106 mg/dL / Ketone: x  / Bili: x / Urobili: x   Blood: x / Protein: x / Nitrite: x   Leuk Esterase: x / RBC: x / WBC x   Sq Epi: x / Non Sq Epi: x / Bacteria: x        RADIOLOGY & ADDITIONAL TESTS:    Imaging Personally Reviewed:    Consultant(s) Notes Reviewed:      Care Discussed with Consultants/Other Providers:  
[facility] Daily Progress Note  =====================================================    SUBJECTIVE: Patient seen and examined. Patient reports that they're feeling well overall. Need to determine OR timing/equipement available.       ALLERGIES:  No Known Allergies      --------------------------------------------------------------------------------------    MEDICATIONS:    Neurologic Medications  acetaminophen     Tablet .. 650 milliGRAM(s) Oral every 6 hours PRN Mild Pain (1 - 3)    Respiratory Medications    Cardiovascular Medications    Gastrointestinal Medications  pantoprazole    Tablet 40 milliGRAM(s) Oral before breakfast    Genitourinary Medications    Hematologic/Oncologic Medications  apixaban 10 milliGRAM(s) Oral every 12 hours    Antimicrobial/Immunologic Medications    Endocrine/Metabolic Medications    Topical/Other Medications    --------------------------------------------------------------------------------------    VITAL SIGNS:  Vital Signs Last 24 Hrs  T(C): 36.7 (13 Jul 2023 05:06), Max: 36.7 (13 Jul 2023 05:06)  T(F): 98.1 (13 Jul 2023 05:06), Max: 98.1 (13 Jul 2023 05:06)  HR: 76 (13 Jul 2023 05:06) (76 - 78)  BP: 130/70 (13 Jul 2023 05:06) (117/76 - 130/70)  BP(mean): --  ABP: --  ABP(mean): --  RR: 18 (13 Jul 2023 05:06) (18 - 18)  SpO2: 98% (13 Jul 2023 05:06) (98% - 98%)    O2 Parameters below as of 13 Jul 2023 05:06  Patient On (Oxygen Delivery Method): room air        --------------------------------------------------------------------------------------    EXAM    General: NAD, resting in bed comfortably.  Cardiac: regular rate, warm and well perfused  Respiratory: Nonlabored respirations, normal cw expansion.  Extremities: LLE edema to thigh, sensation and movement grossly intact, (+) DP signals, leg grossly edematous and tender in thigh    --------------------------------------------------------------------------------------    LABS        --------------------------------------------------------------------------------------    I&ESTEFANIs Detail  --------------------------------------------------------------------------------------
DATE OF SERVICE: 07-12-23 @ 23:07    Patient is a 68y old  Female who presents with a chief complaint of LLE pain (12 Jul 2023 11:24)         PHYSICAL EXAM:  T(C): 36.4 (07-12-23 @ 21:03), Max: 36.7 (07-12-23 @ 04:33)  HR: 78 (07-12-23 @ 21:03) (72 - 82)  BP: 117/76 (07-12-23 @ 21:03) (117/76 - 129/82)  RR: 18 (07-12-23 @ 21:03) (18 - 18)  SpO2: 98% (07-12-23 @ 21:03) (98% - 98%)  Wt(kg): --  I&O's Summary    11 Jul 2023 07:01  -  12 Jul 2023 07:00  --------------------------------------------------------  IN: 240 mL / OUT: 500 mL / NET: -260 mL                                 10.0   6.94  )-----------( 325      ( 11 Jul 2023 07:08 )             30.8     07-11    140  |  104  |  7   ----------------------------<  90  4.1   |  25  |  0.66    Ca    8.7      11 Jul 2023 07:12  Phos  3.1     07-11  Mg     2.2     07-11          Labs personally reviewed      Assessment and Plan:      68F with hx of ovarian cancer now in remission s/p radiation therapy 1/2021, s/p hysterectomy 8/2020 presents with LLE pain for the past 5 days and LLE swelling for 3 days found to have LLE DVT         Problem/Plan - 1:  ·  Problem:  Preop Risk stratification  ·  Plan:  CT Venogram obtained showing acute DVT extending from the left common iliac vein to the visualized left femoral vein  vascular recs noted; plan for venogram likely on Friday in OR;    - Denies cardiac hx  - reports fair functional capacity  - EKG NSR, Echo unremarkable   -Mod risk for low risk venogram, no cardiac contraindication to proceed            Andrew Juarez DO [facility]  Cardiovascular Medicine  [address], [address]  Office: [phone]  Available via Text/call on Microsoft Teams
[facility] Daily Progress Note  =====================================================    SUBJECTIVE: Patient seen and examined at bedside on AM rounds. Patient reports that they're feeling well overall. They still complain of some tenderness of their L upper leg.      ALLERGIES:  No Known Allergies      --------------------------------------------------------------------------------------    MEDICATIONS:    Neurologic Medications  acetaminophen     Tablet .. 650 milliGRAM(s) Oral every 6 hours PRN Mild Pain (1 - 3)    Respiratory Medications    Cardiovascular Medications    Gastrointestinal Medications  pantoprazole    Tablet 40 milliGRAM(s) Oral before breakfast    Genitourinary Medications    Hematologic/Oncologic Medications  apixaban 10 milliGRAM(s) Oral every 12 hours    Antimicrobial/Immunologic Medications    Endocrine/Metabolic Medications    Topical/Other Medications    --------------------------------------------------------------------------------------    VITAL SIGNS:  Vital Signs Last 24 Hrs  T(C): 37.6 (10 Jul 2023 04:34), Max: 37.6 (10 Jul 2023 04:34)  T(F): 99.7 (10 Jul 2023 04:34), Max: 99.7 (10 Jul 2023 04:34)  HR: 86 (10 Jul 2023 04:34) (78 - 86)  BP: 121/71 (10 Jul 2023 04:34) (96/58 - 121/71)  BP(mean): --  ABP: --  ABP(mean): --  RR: 18 (10 Jul 2023 04:34) (17 - 18)  SpO2: 98% (10 Jul 2023 04:34) (98% - 99%)    O2 Parameters below as of 10 Jul 2023 04:34  Patient On (Oxygen Delivery Method): room air          --------------------------------------------------------------------------------------    EXAM    General: NAD, resting in bed comfortably.  Cardiac: regular rate, warm and well perfused  Respiratory: Nonlabored respirations, normal cw expansion.  Extremities: LLE edema to thigh, sensation and movement grossly intact, (+) DP signals, leg grossly edematous and tender in thigh    --------------------------------------------------------------------------------------    LABS                          11.0   7.77  )-----------( 337      ( 10 Jul 2023 08:35 )             34.4   07-10    141  |  104  |  8   ----------------------------<  87  4.0   |  24  |  0.65    Ca    9.0      10 Jul 2023 08:35  Phos  2.4     07-10  Mg     2.2     07-10      --------------------------------------------------------------------------------------    INS AND OUTS:    I&O's Detail    09 Jul 2023 07:01  -  10 Jul 2023 07:00  --------------------------------------------------------  IN:    Oral Fluid: 480 mL  Total IN: 480 mL    OUT:    Voided (mL): 2 mL  Total OUT: 2 mL    Total NET: 478 mL    --------------------------------------------------------------------------------------
[facility] Daily Progress Note  =====================================================    SUBJECTIVE: Patient seen and examined. Patient reports that they're feeling well overall.       ALLERGIES:  No Known Allergies      --------------------------------------------------------------------------------------    MEDICATIONS:    Neurologic Medications  acetaminophen     Tablet .. 650 milliGRAM(s) Oral every 6 hours PRN Mild Pain (1 - 3)    Respiratory Medications    Cardiovascular Medications    Gastrointestinal Medications  pantoprazole    Tablet 40 milliGRAM(s) Oral before breakfast    Genitourinary Medications    Hematologic/Oncologic Medications  apixaban 10 milliGRAM(s) Oral every 12 hours    Antimicrobial/Immunologic Medications    Endocrine/Metabolic Medications    Topical/Other Medications    --------------------------------------------------------------------------------------    VITAL SIGNS:  ICU Vital Signs Last 24 Hrs  T(C): 36.8 (11 Jul 2023 12:43), Max: 37.2 (11 Jul 2023 04:15)  T(F): 98.2 (11 Jul 2023 12:43), Max: 99 (11 Jul 2023 04:15)  HR: 86 (11 Jul 2023 12:43) (77 - 89)  BP: 106/71 (11 Jul 2023 12:43) (106/71 - 119/68)  BP(mean): --  ABP: --  ABP(mean): --  RR: 18 (11 Jul 2023 12:43) (18 - 18)  SpO2: 96% (11 Jul 2023 12:43) (96% - 97%)    O2 Parameters below as of 11 Jul 2023 12:43  Patient On (Oxygen Delivery Method): room air    --------------------------------------------------------------------------------------    EXAM    General: NAD, resting in bed comfortably.  Cardiac: regular rate, warm and well perfused  Respiratory: Nonlabored respirations, normal cw expansion.  Extremities: LLE edema to thigh, sensation and movement grossly intact, (+) DP signals, leg grossly edematous and tender in thigh    --------------------------------------------------------------------------------------    LABS                                     10.0   6.94  )-----------( 325      ( 11 Jul 2023 07:08 )             30.8   07-11    140  |  104  |  7   ----------------------------<  90  4.1   |  25  |  0.66    Ca    8.7      11 Jul 2023 07:12  Phos  3.1     07-11  Mg     2.2     07-11        --------------------------------------------------------------------------------------    I&O's Detail    --------------------------------------------------------------------------------------
[facility] Daily Progress Note  =====================================================    SUBJECTIVE: Patient seen and examined. Patient seen at bedside yesterday and informed that transfer to [facility] would expedite procedure. Patient understanding and accepting of plan.        ALLERGIES:  No Known Allergies      --------------------------------------------------------------------------------------    MEDICATIONS:    Neurologic Medications  acetaminophen     Tablet .. 650 milliGRAM(s) Oral every 6 hours PRN Mild Pain (1 - 3)    Respiratory Medications    Cardiovascular Medications    Gastrointestinal Medications  pantoprazole    Tablet 40 milliGRAM(s) Oral before breakfast    Genitourinary Medications    Hematologic/Oncologic Medications  apixaban 10 milliGRAM(s) Oral every 12 hours    Antimicrobial/Immunologic Medications    Endocrine/Metabolic Medications    Topical/Other Medications    --------------------------------------------------------------------------------------    VITAL SIGNS:   Vital Signs Last 24 Hrs  T(C): 36.8 (13 Jul 2023 20:55), Max: 36.8 (13 Jul 2023 11:36)  T(F): 98.3 (13 Jul 2023 20:55), Max: 98.3 (13 Jul 2023 20:55)  HR: 81 (13 Jul 2023 20:55) (75 - 81)  BP: 113/69 (13 Jul 2023 20:55) (113/69 - 121/86)  BP(mean): --  ABP: --  ABP(mean): --  RR: 18 (13 Jul 2023 20:55) (18 - 18)  SpO2: 96% (13 Jul 2023 20:55) (96% - 96%)    O2 Parameters below as of 13 Jul 2023 20:55  Patient On (Oxygen Delivery Method): room air        --------------------------------------------------------------------------------------    EXAM    General: NAD, resting in bed comfortably.  Cardiac: regular rate, warm and well perfused  Respiratory: Nonlabored respirations, normal cw expansion.  Extremities: LLE edema to thigh, sensation and movement grossly intact, (+) DP signals, leg grossly edematous and tender in thigh    --------------------------------------------------------------------------------------    LABS        --------------------------------------------------------------------------------------    IJEANNEs Detail  --------------------------------------------------------------------------------------
[facility] Division of Hospital Medicine  Bhumika Ferrari MD  Available via MS Teams    SUBJECTIVE / OVERNIGHT EVENTS: No acute events overnight. Patient feeling well overall. LLE pain and swelling continues to improve. Denies any chest pain or SOB. Denies any other concerns or complaints at this time.     Review of Systems:   CONSTITUTIONAL: No fever   EYES: No eye pain, visual disturbances, or discharge  ENMT: No difficulty hearing   RESPIRATORY: No SOB. No cough   CARDIOVASCULAR: No chest pain   GASTROINTESTINAL: No abdominal or epigastric pain. No nausea, vomiting, or hematemesis; No diarrhea   GENITOURINARY: No dysuria   NEUROLOGICAL: No headache   SKIN: No itching    MUSCULOSKELETAL: pain and swelling in LLE improving; No muscle or back pain  PSYCHIATRIC: No depression or anxiety   HEME/LYMPH: No easy bruising or bleeding gums      MEDICATIONS  (STANDING):  apixaban 10 milliGRAM(s) Oral every 12 hours  pantoprazole    Tablet 40 milliGRAM(s) Oral before breakfast    MEDICATIONS  (PRN):  acetaminophen     Tablet .. 650 milliGRAM(s) Oral every 6 hours PRN Mild Pain (1 - 3)    PHYSICAL EXAM:  Vital Signs Last 24 Hrs  T(C): 36.8 (13 Jul 2023 11:36), Max: 36.8 (13 Jul 2023 11:36)  T(F): 98.2 (13 Jul 2023 11:36), Max: 98.2 (13 Jul 2023 11:36)  HR: 75 (13 Jul 2023 11:36) (75 - 78)  BP: 121/86 (13 Jul 2023 11:36) (117/76 - 130/70)  RR: 18 (13 Jul 2023 11:36) (18 - 18)  SpO2: 96% (13 Jul 2023 11:36) (96% - 98%)    Parameters below as of 13 Jul 2023 11:36  Patient On (Oxygen Delivery Method): room air      CONSTITUTIONAL: NAD, well-developed   EYES: PERRLA; conjunctiva and sclera clear  ENMT: Moist oral mucosa, no pharyngeal injection or exudates   NECK: Supple   RESPIRATORY: Normal respiratory effort; lungs are clear to auscultation bilaterally  CARDIOVASCULAR: Regular rate and rhythm, normal S1 and S2, no murmur   ABDOMEN: Nontender to palpation, normoactive bowel sounds   MUSCULOSKELETAL: no clubbing or cyanosis of digits; no tenderness to palpation of left thigh; LLE with ace bandage around leg   PSYCH: A+O to person, place, and time; affect appropriate  NEUROLOGY: no gross sensory deficits   SKIN: No rashes     LABS:        RADIOLOGY & ADDITIONAL TESTS:  New Imaging Personally Reviewed Today:  New Electrocardiogram Personally Reviewed Today:  Other Results Reviewed Today:   Prior or Outpatient Records Reviewed Today with Summary:    COORDINATION OF CARE:  Consultant Communication and Details of Discussion (where applicable):    
[facility] Division of Hospital Medicine  Bhumika Ferrari MD  Available via MS Teams    SUBJECTIVE / OVERNIGHT EVENTS: No acute events overnight. Patient still with pain in left leg but states it feels better. Still with some swelling in LLE. Denies any chest pain or SOB. Denies any other concerns or complaints at this time.     Review of Systems:   CONSTITUTIONAL: No fever   EYES: No eye pain, visual disturbances, or discharge  ENMT: No difficulty hearing   RESPIRATORY: No SOB. No cough   CARDIOVASCULAR: No chest pain   GASTROINTESTINAL: No abdominal or epigastric pain. No nausea, vomiting, or hematemesis; No diarrhea   GENITOURINARY: No dysuria   NEUROLOGICAL: No headache   SKIN: No itching    MUSCULOSKELETAL: pain in swelling in left leg; no issues with right leg; No muscle or back pain  PSYCHIATRIC: No depression or anxiety  HEME/LYMPH: No easy bruising or bleeding gums      MEDICATIONS  (STANDING):  apixaban 10 milliGRAM(s) Oral every 12 hours  pantoprazole    Tablet 40 milliGRAM(s) Oral before breakfast    MEDICATIONS  (PRN):  acetaminophen     Tablet .. 650 milliGRAM(s) Oral every 6 hours PRN Mild Pain (1 - 3)       PHYSICAL EXAM:  Vital Signs Last 24 Hrs  T(C): 37.2 (11 Jul 2023 04:15), Max: 37.2 (11 Jul 2023 04:15)  T(F): 99 (11 Jul 2023 04:15), Max: 99 (11 Jul 2023 04:15)  HR: 89 (11 Jul 2023 04:15) (77 - 89)  BP: 119/68 (11 Jul 2023 04:15) (108/68 - 119/68)  RR: 18 (11 Jul 2023 04:15) (18 - 18)  SpO2: 97% (11 Jul 2023 04:15) (96% - 97%)    Parameters below as of 11 Jul 2023 04:15  Patient On (Oxygen Delivery Method): room air      CONSTITUTIONAL: NAD, well-developed   EYES: PERRLA; conjunctiva and sclera clear  ENMT: Moist oral mucosa, no pharyngeal injection or exudates   NECK: Supple   RESPIRATORY: Normal respiratory effort; lungs are clear to auscultation bilaterally  CARDIOVASCULAR: Regular rate and rhythm, normal S1 and S2, no murmur   ABDOMEN: Nontender to palpation, normoactive bowel sounds   MUSCULOSKELETAL: no clubbing or cyanosis of digits; ACE bandage wrapped around left leg; some swelling noted in left thigh   PSYCH: A+O to person, place, and time; affect appropriate  NEUROLOGY: no gross sensory deficits   SKIN: No rashes     LABS:                        10.0   6.94  )-----------( 325      ( 11 Jul 2023 07:08 )             30.8     07-11    140  |  104  |  7   ----------------------------<  90  4.1   |  25  |  0.66    Ca    8.7      11 Jul 2023 07:12  Phos  3.1     07-11  Mg     2.2     07-11      Urinalysis Basic - ( 11 Jul 2023 07:12 )    Color: x / Appearance: x / SG: x / pH: x  Gluc: 90 mg/dL / Ketone: x  / Bili: x / Urobili: x   Blood: x / Protein: x / Nitrite: x   Leuk Esterase: x / RBC: x / WBC x   Sq Epi: x / Non Sq Epi: x / Bacteria: x      RADIOLOGY & ADDITIONAL TESTS:  New Imaging Personally Reviewed Today:  New Electrocardiogram Personally Reviewed Today:  Other Results Reviewed Today:   Prior or Outpatient Records Reviewed Today with Summary:    COORDINATION OF CARE:  Consultant Communication and Details of Discussion (where applicable):    
[facility] Division of Hospital Medicine  Bhumika Ferrari MD  Available via MS Teams    SUBJECTIVE / OVERNIGHT EVENTS: No acute events overnight. Patient feeling well overall. States leg pain and swelling continues to improve. Denies any chest pain or SOB. Denies any other concerns or complaints at this time.     Review of Systems:   CONSTITUTIONAL: No fever   EYES: No eye pain, visual disturbances, or discharge  ENMT: No difficulty hearing   RESPIRATORY: No SOB. No cough   CARDIOVASCULAR: No chest pain   GASTROINTESTINAL: No abdominal or epigastric pain. No nausea, vomiting, or hematemesis; No diarrhea    GENITOURINARY: No dysuria   NEUROLOGICAL: No headaches   SKIN: No itching    MUSCULOSKELETAL: improving pain and swelling in LLE; No muscle or back pain  PSYCHIATRIC: No depression or anxiety  HEME/LYMPH: No easy bruising or bleeding gums      MEDICATIONS  (STANDING):  apixaban 10 milliGRAM(s) Oral every 12 hours  pantoprazole    Tablet 40 milliGRAM(s) Oral before breakfast    MEDICATIONS  (PRN):  acetaminophen     Tablet .. 650 milliGRAM(s) Oral every 6 hours PRN Mild Pain (1 - 3)      I&O's Summary    11 Jul 2023 07:01  -  12 Jul 2023 07:00  --------------------------------------------------------  IN: 240 mL / OUT: 500 mL / NET: -260 mL      PHYSICAL EXAM:  Vital Signs Last 24 Hrs  T(C): 36.5 (12 Jul 2023 11:03), Max: 36.7 (11 Jul 2023 20:47)  T(F): 97.7 (12 Jul 2023 11:03), Max: 98 (11 Jul 2023 20:47)  HR: 72 (12 Jul 2023 11:03) (72 - 83)  BP: 120/78 (12 Jul 2023 11:03) (118/72 - 129/82)  RR: 18 (12 Jul 2023 11:03) (18 - 18)  SpO2: 98% (12 Jul 2023 11:03) (96% - 98%)    Parameters below as of 12 Jul 2023 11:03  Patient On (Oxygen Delivery Method): room air      CONSTITUTIONAL: NAD, well-developed   EYES: PERRLA; conjunctiva and sclera clear  ENMT: Moist oral mucosa, no pharyngeal injection or exudates   NECK: Supple   RESPIRATORY: Normal respiratory effort; lungs are clear to auscultation bilaterally  CARDIOVASCULAR: Regular rate and rhythm, normal S1 and S2, no murmur   ABDOMEN: Nontender to palpation, normoactive bowel sounds   MUSCULOSKELETAL: no clubbing or cyanosis of digits; mild swelling in left thigh  PSYCH: A+O to person, place, and time; affect appropriate  NEUROLOGY: no gross sensory deficits   SKIN: No rashes     LABS:                        10.0   6.94  )-----------( 325      ( 11 Jul 2023 07:08 )             30.8     07-11    140  |  104  |  7   ----------------------------<  90  4.1   |  25  |  0.66    Ca    8.7      11 Jul 2023 07:12  Phos  3.1     07-11  Mg     2.2     07-11      Urinalysis Basic - ( 11 Jul 2023 07:12 )    Color: x / Appearance: x / SG: x / pH: x  Gluc: 90 mg/dL / Ketone: x  / Bili: x / Urobili: x   Blood: x / Protein: x / Nitrite: x   Leuk Esterase: x / RBC: x / WBC x   Sq Epi: x / Non Sq Epi: x / Bacteria: x      RADIOLOGY & ADDITIONAL TESTS:  New Imaging Personally Reviewed Today:  New Electrocardiogram Personally Reviewed Today:  Other Results Reviewed Today:   Prior or Outpatient Records Reviewed Today with Summary:    COORDINATION OF CARE:  Consultant Communication and Details of Discussion (where applicable):    
Patient is a 68y old  Female who presents with a chief complaint of LLE pain (08 Jul 2023 13:08)      SUBJECTIVE / OVERNIGHT EVENTS: translating in mt by self, family at bedside, pt with some groin pain on left, no cp, sob, abd pain     MEDICATIONS  (STANDING):  apixaban 10 milliGRAM(s) Oral every 12 hours  pantoprazole    Tablet 40 milliGRAM(s) Oral before breakfast    MEDICATIONS  (PRN):  acetaminophen     Tablet .. 650 milliGRAM(s) Oral every 6 hours PRN Mild Pain (1 - 3)        CAPILLARY BLOOD GLUCOSE        I&O's Summary      PHYSICAL EXAM:  GENERAL: NAD, well-developed  HEAD:  Atraumatic, Normocephalic  EYES: conjunctiva and sclera clear  NECK: Supple, No JVD  CHEST/LUNG: Clear to auscultation bilaterally; No wheeze  HEART: Regular rate and rhythm; No murmurs, rubs, or gallops  ABDOMEN: Soft, Nontender, Nondistended; Bowel sounds present  EXTREMITIES:  +RLE2 edema   PSYCH: AAOx3      LABS:                        11.1   8.47  )-----------( 279      ( 08 Jul 2023 13:06 )             34.4     07-08    138  |  102  |  8   ----------------------------<  110<H>  3.6   |  23  |  0.68    Ca    8.7      08 Jul 2023 07:03  Phos  2.5     07-08  Mg     1.9     07-08    TPro  8.1  /  Alb  4.1  /  TBili  0.3  /  DBili  x   /  AST  17  /  ALT  14  /  AlkPhos  81  07-07    PT/INR - ( 07 Jul 2023 03:34 )   PT: 12.7 sec;   INR: 1.10 ratio         PTT - ( 07 Jul 2023 11:38 )  PTT:29.5 sec      Urinalysis Basic - ( 08 Jul 2023 07:03 )    Color: x / Appearance: x / SG: x / pH: x  Gluc: 110 mg/dL / Ketone: x  / Bili: x / Urobili: x   Blood: x / Protein: x / Nitrite: x   Leuk Esterase: x / RBC: x / WBC x   Sq Epi: x / Non Sq Epi: x / Bacteria: x        RADIOLOGY & ADDITIONAL TESTS:    Imaging Personally Reviewed:    Consultant(s) Notes Reviewed:      Care Discussed with Consultants/Other Providers:  
[facility] Division of Hospital Medicine  Bhumika Ferrari MD  Available via MS Teams    SUBJECTIVE / OVERNIGHT EVENTS: No acute events overnight. Patient feeling well this morning. Denies any chest pain or SOB. Leg pain improving. No other concerns or complaints at this time.     Review of Systems:   CONSTITUTIONAL: No fever   EYES: No eye pain, visual disturbances, or discharge  ENMT: No difficulty hearing   RESPIRATORY: No SOB. No cough   CARDIOVASCULAR: No chest pain   GASTROINTESTINAL: No abdominal or epigastric pain. No nausea, vomiting, or hematemesis; No diarrhea   GENITOURINARY: No dysuria   NEUROLOGICAL: No headache   SKIN: No itching     MUSCULOSKELETAL: pain in left leg improving; No muscle or back pain  PSYCHIATRIC: No depression or anxiety   HEME/LYMPH: No easy bruising or bleeding gums      MEDICATIONS  (STANDING):  apixaban 5 milliGRAM(s) Oral every 12 hours  pantoprazole    Tablet 40 milliGRAM(s) Oral before breakfast    MEDICATIONS  (PRN):  acetaminophen     Tablet .. 650 milliGRAM(s) Oral every 6 hours PRN Mild Pain (1 - 3)      PHYSICAL EXAM:  Vital Signs Last 24 Hrs  T(C): 36.8 (15 Jul 2023 05:30), Max: 36.9 (14 Jul 2023 22:10)  T(F): 98.2 (15 Jul 2023 05:30), Max: 98.4 (14 Jul 2023 22:10)  HR: 75 (15 Jul 2023 05:30) (75 - 81)  BP: 121/74 (15 Jul 2023 05:30) (102/66 - 121/74)  RR: 18 (15 Jul 2023 05:30) (18 - 20)  SpO2: 98% (15 Jul 2023 05:30) (98% - 98%)    Parameters below as of 15 Jul 2023 05:30  Patient On (Oxygen Delivery Method): room air      CONSTITUTIONAL: NAD, well-developed   EYES: PERRLA; conjunctiva and sclera clear  ENMT: Moist oral mucosa, no pharyngeal injection or exudates   NECK: Supple   RESPIRATORY: Normal respiratory effort; lungs are clear to auscultation bilaterally  CARDIOVASCULAR: Regular rate and rhythm, normal S1 and S2, no murmur   ABDOMEN: Nontender to palpation, normoactive bowel sounds   MUSCULOSKELETAL: no clubbing or cyanosis of digits; no joint swelling or tenderness to palpation  PSYCH: A+O to person, place, and time; affect appropriate  NEUROLOGY: no gross sensory deficits   SKIN: No rashes     LABS:                        11.5   7.33  )-----------( 432      ( 14 Jul 2023 06:45 )             37.0     07-14    141  |  102  |  10  ----------------------------<  96  4.0   |  25  |  0.69    Ca    9.9      14 Jul 2023 06:45  Phos  3.4     07-14  Mg     2.4     07-14      PT/INR - ( 14 Jul 2023 06:45 )   PT: 15.4 sec;   INR: 1.32 ratio         PTT - ( 14 Jul 2023 06:45 )  PTT:35.7 sec      Urinalysis Basic - ( 14 Jul 2023 06:45 )    Color: x / Appearance: x / SG: x / pH: x  Gluc: 96 mg/dL / Ketone: x  / Bili: x / Urobili: x   Blood: x / Protein: x / Nitrite: x   Leuk Esterase: x / RBC: x / WBC x   Sq Epi: x / Non Sq Epi: x / Bacteria: x      RADIOLOGY & ADDITIONAL TESTS:  New Imaging Personally Reviewed Today:  New Electrocardiogram Personally Reviewed Today:  Other Results Reviewed Today:   Prior or Outpatient Records Reviewed Today with Summary:    COORDINATION OF CARE:  Consultant Communication and Details of Discussion (where applicable):    
[facility] Division of Hospital Medicine  Bhumika Ferrari MD  Available via MS Teams    SUBJECTIVE / OVERNIGHT EVENTS: No acute events overnight. Patient states left leg pain continues to improve. Denies any chest pain or SOB. Denies any other concerns or complaints at this time.     Review of Systems:   CONSTITUTIONAL: No fever   EYES: No eye pain, visual disturbances, or discharge  ENMT: No difficulty hearing   RESPIRATORY: No SOB. No cough   CARDIOVASCULAR: No chest pain   GASTROINTESTINAL: No abdominal or epigastric pain. No nausea, vomiting, or hematemesis; No diarrhea   GENITOURINARY: No dysuria   NEUROLOGICAL: No headache   SKIN: No itching    MUSCULOSKELETAL: improving left leg pain and swelling; No muscle or back pain  PSYCHIATRIC: No depression or anxiety   HEME/LYMPH: No easy bruising or bleeding gums      MEDICATIONS  (STANDING):  apixaban 5 milliGRAM(s) Oral every 12 hours  pantoprazole    Tablet 40 milliGRAM(s) Oral before breakfast    MEDICATIONS  (PRN):  acetaminophen     Tablet .. 650 milliGRAM(s) Oral every 6 hours PRN Mild Pain (1 - 3)      PHYSICAL EXAM:  Vital Signs Last 24 Hrs  T(C): 36.6 (14 Jul 2023 12:26), Max: 36.8 (13 Jul 2023 20:55)  T(F): 97.8 (14 Jul 2023 12:26), Max: 98.3 (13 Jul 2023 20:55)  HR: 81 (14 Jul 2023 12:26) (76 - 81)  BP: 119/70 (14 Jul 2023 12:26) (113/69 - 131/77)  RR: 18 (14 Jul 2023 12:26) (18 - 18)  SpO2: 97% (14 Jul 2023 12:26) (96% - 100%)    Parameters below as of 14 Jul 2023 12:26  Patient On (Oxygen Delivery Method): room air      CONSTITUTIONAL: NAD, well-developed   EYES: PERRLA; conjunctiva and sclera clear  ENMT: Moist oral mucosa, no pharyngeal injection or exudates   NECK: Supple   RESPIRATORY: Normal respiratory effort; lungs are clear to auscultation bilaterally  CARDIOVASCULAR: Regular rate and rhythm, normal S1 and S2, no murmur   ABDOMEN: Nontender to palpation, normoactive bowel sounds   MUSCULOSKELETAL: no clubbing or cyanosis of digits; no joint swelling or tenderness to palpation  PSYCH: A+O to person, place, and time; affect appropriate  NEUROLOGY: no gross sensory deficits   SKIN: No rashes     LABS:                        11.5   7.33  )-----------( 432      ( 14 Jul 2023 06:45 )             37.0     07-14    141  |  102  |  10  ----------------------------<  96  4.0   |  25  |  0.69    Ca    9.9      14 Jul 2023 06:45  Phos  3.4     07-14  Mg     2.4     07-14      PT/INR - ( 14 Jul 2023 06:45 )   PT: 15.4 sec;   INR: 1.32 ratio         PTT - ( 14 Jul 2023 06:45 )  PTT:35.7 sec      Urinalysis Basic - ( 14 Jul 2023 06:45 )    Color: x / Appearance: x / SG: x / pH: x  Gluc: 96 mg/dL / Ketone: x  / Bili: x / Urobili: x   Blood: x / Protein: x / Nitrite: x   Leuk Esterase: x / RBC: x / WBC x   Sq Epi: x / Non Sq Epi: x / Bacteria: x      RADIOLOGY & ADDITIONAL TESTS:  New Imaging Personally Reviewed Today:  New Electrocardiogram Personally Reviewed Today:  Other Results Reviewed Today:   Prior or Outpatient Records Reviewed Today with Summary:    COORDINATION OF CARE:  Consultant Communication and Details of Discussion (where applicable):    
[facility] Division of Hospital Medicine  Bhumika Ferrari MD  Available via MS Teams    SUBJECTIVE / OVERNIGHT EVENTS: No acute events overnight. Patient with some pain and swelling in LLE but overall doing well. Denies any chest pain or SOB. Denies any other concerns or complaints at this time.     Review of Systems:   CONSTITUTIONAL: No fever   EYES: No eye pain, visual disturbances, or discharge  ENMT: No difficulty hearing   RESPIRATORY: No SOB. No cough   CARDIOVASCULAR: No chest pain   GASTROINTESTINAL: No abdominal or epigastric pain. No nausea, vomiting, or hematemesis; No diarrhea   GENITOURINARY: No dysuria   NEUROLOGICAL: No headache   SKIN: No itching    MUSCULOSKELETAL: pain and swelling in LLE; denies any issues with RLE; No muscle or back pain  PSYCHIATRIC: No depression or anxiety  HEME/LYMPH: No easy bruising or bleeding gums      MEDICATIONS  (STANDING):  apixaban 10 milliGRAM(s) Oral every 12 hours  pantoprazole    Tablet 40 milliGRAM(s) Oral before breakfast    MEDICATIONS  (PRN):  acetaminophen     Tablet .. 650 milliGRAM(s) Oral every 6 hours PRN Mild Pain (1 - 3)      I&O's Summary    09 Jul 2023 07:01  -  10 Jul 2023 07:00  --------------------------------------------------------  IN: 480 mL / OUT: 2 mL / NET: 478 mL      PHYSICAL EXAM:  Vital Signs Last 24 Hrs  T(C): 37.6 (10 Jul 2023 04:34), Max: 37.6 (10 Jul 2023 04:34)  T(F): 99.7 (10 Jul 2023 04:34), Max: 99.7 (10 Jul 2023 04:34)  HR: 86 (10 Jul 2023 04:34) (78 - 89)  BP: 121/71 (10 Jul 2023 04:34) (96/58 - 121/71)  RR: 18 (10 Jul 2023 04:34) (17 - 18)  SpO2: 98% (10 Jul 2023 04:34) (98% - 100%)    Parameters below as of 10 Jul 2023 04:34  Patient On (Oxygen Delivery Method): room air      CONSTITUTIONAL: NAD, well-developed   EYES: PERRLA; conjunctiva and sclera clear  ENMT: Moist oral mucosa, no pharyngeal injection or exudates   NECK: Supple   RESPIRATORY: Normal respiratory effort; lungs are clear to auscultation bilaterally  CARDIOVASCULAR: Regular rate and rhythm, normal S1 and S2, no murmur   ABDOMEN: Nontender to palpation, normoactive bowel sounds   MUSCULOSKELETAL: no clubbing or cyanosis of digits; no joint swelling or tenderness to palpation  PSYCH: A+O to person, place, and time; affect appropriate  NEUROLOGY: no gross sensory deficits   SKIN: No rashes     LABS:                        11.0   7.77  )-----------( 337      ( 10 Jul 2023 08:35 )             34.4     07-10    141  |  104  |  8   ----------------------------<  87  4.0   |  24  |  0.65    Ca    9.0      10 Jul 2023 08:35  Phos  2.4     07-10  Mg     2.2     07-10      Urinalysis Basic - ( 10 Jul 2023 08:35 )    Color: x / Appearance: x / SG: x / pH: x  Gluc: 87 mg/dL / Ketone: x  / Bili: x / Urobili: x   Blood: x / Protein: x / Nitrite: x   Leuk Esterase: x / RBC: x / WBC x   Sq Epi: x / Non Sq Epi: x / Bacteria: x      RADIOLOGY & ADDITIONAL TESTS:  New Imaging Personally Reviewed Today:  < from: CT Abdomen and Pelvis w/ IV Cont (07.09.23 @ 18:12) >  IMPRESSION: Acute DVT extending from the left common iliac vein to the   visualized left femoral vein with question of May Thurner syndrome.    < end of copied text >    New Electrocardiogram Personally Reviewed Today:  Other Results Reviewed Today:   Prior or Outpatient Records Reviewed Today with Summary:    COORDINATION OF CARE:  Consultant Communication and Details of Discussion (where applicable):    
none of the above